# Patient Record
Sex: FEMALE | Race: WHITE | HISPANIC OR LATINO | Employment: UNEMPLOYED | URBAN - METROPOLITAN AREA
[De-identification: names, ages, dates, MRNs, and addresses within clinical notes are randomized per-mention and may not be internally consistent; named-entity substitution may affect disease eponyms.]

---

## 2017-01-05 ENCOUNTER — HOSPITAL ENCOUNTER (OUTPATIENT)
Facility: MEDICAL CENTER | Age: 27
End: 2017-01-05
Attending: OBSTETRICS & GYNECOLOGY | Admitting: OBSTETRICS & GYNECOLOGY

## 2017-01-05 ENCOUNTER — APPOINTMENT (OUTPATIENT)
Dept: OBGYN | Facility: CLINIC | Age: 27
End: 2017-01-05
Payer: MEDICAID

## 2017-01-09 ENCOUNTER — HOSPITAL ENCOUNTER (OUTPATIENT)
Facility: MEDICAL CENTER | Age: 27
End: 2017-01-09
Attending: NURSE PRACTITIONER
Payer: COMMERCIAL

## 2017-01-09 ENCOUNTER — ROUTINE PRENATAL (OUTPATIENT)
Dept: OBGYN | Facility: CLINIC | Age: 27
End: 2017-01-09
Payer: MEDICAID

## 2017-01-09 ENCOUNTER — INITIAL PRENATAL (OUTPATIENT)
Dept: OBGYN | Facility: CLINIC | Age: 27
End: 2017-01-09
Payer: MEDICAID

## 2017-01-09 VITALS
WEIGHT: 153 LBS | SYSTOLIC BLOOD PRESSURE: 100 MMHG | BODY MASS INDEX: 24.59 KG/M2 | DIASTOLIC BLOOD PRESSURE: 58 MMHG | HEIGHT: 66 IN

## 2017-01-09 DIAGNOSIS — Z34.80 SUPERVISION OF OTHER NORMAL PREGNANCY, ANTEPARTUM: ICD-10-CM

## 2017-01-09 DIAGNOSIS — Z34.03 ENCOUNTER FOR SUPERVISION OF NORMAL FIRST PREGNANCY IN THIRD TRIMESTER: ICD-10-CM

## 2017-01-09 DIAGNOSIS — O36.5930 SMALL FOR DATES AFFECTING MANAGEMENT OF MOTHER, THIRD TRIMESTER, NOT APPLICABLE OR UNSPECIFIED FETUS: ICD-10-CM

## 2017-01-09 PROBLEM — Z34.00 SUPERVISION OF NORMAL FIRST PREGNANCY: Status: ACTIVE | Noted: 2017-01-09

## 2017-01-09 LAB
APPEARANCE UR: NORMAL
BILIRUB UR STRIP-MCNC: NORMAL MG/DL
COLOR UR AUTO: NORMAL
GLUCOSE UR STRIP.AUTO-MCNC: NEGATIVE MG/DL
KETONES UR STRIP.AUTO-MCNC: NEGATIVE MG/DL
LEUKOCYTE ESTERASE UR QL STRIP.AUTO: NEGATIVE
NITRITE UR QL STRIP.AUTO: NEGATIVE
NST ACOUSTIC STIMULATION: NORMAL
NST ACTION NECESSARY: NORMAL
NST ASSESSMENT: NORMAL
NST BASELINE: 130
NST INDICATIONS: NORMAL
NST OTHER DATA: NORMAL
NST READ BY: NORMAL
NST RETURN: NORMAL
NST UTERINE ACTIVITY: NORMAL
PH UR STRIP.AUTO: 6 [PH] (ref 5–8)
PROT UR QL STRIP: NEGATIVE MG/DL
RBC UR QL AUTO: NEGATIVE
SP GR UR STRIP.AUTO: 1
UROBILINOGEN UR STRIP-MCNC: NORMAL MG/DL

## 2017-01-09 PROCEDURE — 59025 FETAL NON-STRESS TEST: CPT | Performed by: NURSE PRACTITIONER

## 2017-01-09 PROCEDURE — 90040 PR PRENATAL FOLLOW UP: CPT | Performed by: NURSE PRACTITIONER

## 2017-01-09 PROCEDURE — 59401 PR NEW OB VISIT: CPT | Performed by: NURSE PRACTITIONER

## 2017-01-09 PROCEDURE — 81002 URINALYSIS NONAUTO W/O SCOPE: CPT | Performed by: NURSE PRACTITIONER

## 2017-01-09 NOTE — PROGRESS NOTES
"S:  Concetta Lozada is a 26 y.o.  who presents for her new OB exam.  She is 38w1d with and LIDIA of Estimated Date of Delivery: 17. Dating based on patient report - previous care in [facility]. She has c/o non-progressive cramping.  No bleeding or leaking of fluid. She is currently not working. No heavy lifting or chemical exposure. No ER visits or previous care.     declines AFP.  declines CF.  Denies VB, LOF, or cramping.  Denies dysuria, vaginal DC. Reports good fetal movement.     Pt is  and lives with FOB.  Pregnancy is desired.      Past Medical History   Diagnosis Date   • Other ill-defined conditions      POCS     Family History   Problem Relation Age of Onset   • Diabetes Mother    • Hypertension Mother      Social History     Social History   • Marital Status: Single     Spouse Name: N/A   • Number of Children: N/A   • Years of Education: N/A     Occupational History   • Not on file.     Social History Main Topics   • Smoking status: Never Smoker    • Smokeless tobacco: Not on file   • Alcohol Use: No   • Drug Use: No   • Sexual Activity:     Partners: Male     Other Topics Concern   • Not on file     Social History Narrative   • No narrative on file     OB History    Para Term  AB SAB TAB Ectopic Multiple Living   1               # Outcome Date GA Lbr Alec/2nd Weight Sex Delivery Anes PTL Lv   1 Current                   History of HSV I or II in self or partner: no  History of Thyroid problems: no    O:    Filed Vitals:    17 1400   BP: 100/58   Height: 1.664 m (5' 5.5\")   Weight: 69.4 kg (153 lb)      See Prenatal Physical.    Wet mount: none      A:   1.  IUP @ 38w1d per patient report of previous care in [facility]        2.  S<D        3.  See problem list below        4.  No verified dating       Patient Active Problem List    Diagnosis Date Noted   • Supervision of normal first pregnancy 2017         P:  1.  GC/CT done        2.  Prenatal labs ordered - lab slip " given including glucola        3.  Discussed PNV, diet, avoidances and adequate water intake        4.  NOB packet given        5.  Return to office in 1 wks        6.  Complete OB US first available.         7.  NST for size less than dates. Continue 2x week nst until dating verified. Fit in ultrasound for tomorrow at this clinic.         8. GBS today.     No orders of the defined types were placed in this encounter.

## 2017-01-09 NOTE — PROGRESS NOTES
NOB today. Sure about LMP  Had some prenatal care in [place], no records available.  On PNV  Last pap 1 year ago= negative ( Jesus)  Phone #  [phone]  Pharmacy confirmed  + FM, no VB or LOF  C/o irregular UC's   Kick count sheet given today.

## 2017-01-09 NOTE — Clinical Note
Cuente los Movimientos de lazcano Bebé  Otro paso importante para la hannah de lazcano bebé    Concetta Lozada     [facility]            Dept: [phone]    ¿Cuántas semanas tiene de embarazo? 38w1d    Fecha cuando tiene que comenzar a contar el movimiento: 1/9/17                  Narberth debe usar viky diagrama    Luis Felipe manera en que lazcano doctor puede controlar a hannah de lazcano bebé es sabiendo cuantas veces se mueve lazcano bebé en el útero, o por medio de las “pataditas”.  Usted podrá ayudarle a lazcano médico al usar cada día el siguiente diagrama.    Cada día, usted debe prestar atención a cuantas horas le lleva a lazcano bebé moverse 10 veces.  Comience a contar en la mañana, lo antes posible después de haberse levantado.    · Primeramente, escriba la hora en que se mueve lazcano bebé, hasta llegar a 10 veces.  · Colóquele un check o palomita a cada cuadrito cada vez que lazcano bebé se mueva hasta que complete 10 veces.  · Escriba la hora cuando termine de contar 10 veces en la última columna.  · Sume el total del tiempo que le llevó contar los 10 movimientos.  · Finalmente, complete el cuadrito de cuantas horas le llevó hacerlo.    Después de judie contado los 10 movimientos, ya no tendrá que contar los demás movimientos por el darell del día.  A la mañana siguiente, comience a contar de nuevo cuantas veces se mueve el bebé desde el momento en que se levante.    ¿Qué tendría que considerarse un “movimiento”?  Es difícil de decirlo porque es distinto de luis felipe madre a otra, y de un embarazo a otro.  Lo importante es que cuente el movimiento de la misma manera ricki el transcurso de lazcano embarazo.  Si tiene preguntas adicionales, pregúntele a lazacno doctor.    ¡Cuente cuidadosamente cada día!     MUESTRA:  Semana 28    ¿Cuántas horas le ha llevado sentir 10 movimientos?        Hora de Inicio     1     2     3     4     5     6     7     8     9     10   Hora de Finlizar   Roma. 8:20 ·  ·  ·  ·  ·  ·  ·  ·  ·  ·  11:40   Mar.               Mié.                Jue.               Vie.               Sáb.               Dom.                 IMPORTANTE:  Usted debe contactar a lazcano doctor si le lleva más de 2 horas sentir 10 movimientos de lazcano bebé.    Cada mañana, escriba la hora de inicio y comience a contar los movimientos de lazcano bebé.  Hágalo colocándole un check o palomita a cada cuadrito cada vez que sienta un movimiento de lazcano bebé.  Cuando haya sentido 10 “pataditas”, escriba la hora en que terminó de contar en la última columna.  Luego, complete en la cajita (arriba de la vanna de check o palomita) el número total de horas que le llevó hacerlo.  Asegúrese de leer completamente las instrucciones en la página anterior.

## 2017-01-09 NOTE — MR AVS SNAPSHOT
"        Concetta Dodd Amaya   2017 2:00 PM   Initial Prenatal   MRN: [id number]    Department[facility]  [facility]   Dept Phone:  [phone]    Description:  Female : 1990   Provider:  Jennifer Maynard D.N.P.; [facility] INTAKE           Allergies as of 2017     No Known Allergies      You were diagnosed with     Supervision of other normal pregnancy, antepartum   [5454545]       Encounter for supervision of normal first pregnancy in third trimester   [710495]         Vital Signs     Blood Pressure Height Weight Body Mass Index Last Menstrual Period Smoking Status    100/58 mmHg 1.664 m (5' 5.5\") 69.4 kg (153 lb) 25.06 kg/m2 2016 Never Smoker       Basic Information     Date Of Birth Sex Race Ethnicity Preferred Language    1990 Female Unable to Obtain  Origin (Surinamese,Sammarinese,Cameroonian,Bulgarian, etc) Surinamese      Your appointments     Beau 10, 2017  7:30 AM   US PREG 60 with S [place] US 2   [facility] [place]    [facility] [place]  [address]  [address]  [facility] [address]   [phone]           For [facility] patients only: 1. Please arrive 15 min prior to your appointment time. 2. If you're late, you will be rescheduled for the next available appointment. 3. If you need to reschedule your appointment, please call us at [phone] 48 hours prior to your appointment. 4. Do not bring children as they will not be allowed in exam room. 5. Only one family member may be present in room during exam. 6. The exam will be 30-60 minutes depending on the exam ordered by the physician. 7. The sonographer is not allowed to discuss findings during the exam. Your provider will go over the results with you at your next appointment. 8. The purpose of this ultrasound is to determine if baby is healthy. Diagnostic ultrasounds are NOT to determine the gender of the baby. 9. NO photography or video recording is allowed in exam room. 10. NO cell phones allowed " in the exam room. INFORMACION SOBRE LAZCANO ULTRASONIDO 1. Por favor de llegar 15 minutos antes de lazcano miguel angel. 2. Si llega tarde, le tenemos que cambiar la miguel angel para otra fecha. 3. Si necesita cambiar lazcano miguel angel, por favor llame 48 horas antes de la miguel angel. [phone] 4. Por favor no traer niños. No se permiten en cuarto de Ultrasonido. 5. Solamente se permite luis felipe persona en el cuarto ricki el examen. 6. El examen dura 30-60 minutos, dependiendo del examen ordenado por el Doctor. 7. El Sonógrafo no está autorizado hablar sobre lazcano examen. Lazcano doctor o partera le va explicar los resultados en lazcano próxima miguel angel. 8. El propósito del Ultrasonido es para determinar si lazcano tyshawn viene saludable. No es para determinar el sexo de lazcano tyshawn. 9. Por favor no fotos o cámaras de grabar. 10. No celulares permitidos en el cuarto de examen.              Problem List              ICD-10-CM Priority Class Noted - Resolved    Supervision of normal first pregnancy Z34.00   1/9/2017 - Present      Health Maintenance     Patient has no pending health maintenance at this time      Results     POCT Urinalysis      Component Value Standard Range & Units    POC Color  Negative    POC Appearance  Negative    POC Leukocyte Esterase NEGATIVE Negative    POC Nitrites NEGATIVE Negative    POC Urobiligen  Negative (0.2) mg/dL    POC Protein NEGATIVE Negative mg/dL    POC Urine PH 6.0 5.0 - 8.0    POC Blood NEGATIVE Negative    POC Specific Gravity 1.005 <1.005 - >1.030    POC Ketones NEGATIVE Negative mg/dL    POC Biliruben  Negative mg/dL    POC Glucose NEGATIVE Negative mg/dL                        Current Immunizations     No immunizations on file.      Below and/or attached are the medications your provider expects you to take. Review all of your home medications and newly ordered medications with your provider and/or pharmacist. Follow medication instructions as directed by your provider and/or pharmacist. Please keep your medication list with you and share  with your provider. Update the information when medications are discontinued, doses are changed, or new medications (including over-the-counter products) are added; and carry medication information at all times in the event of emergency situations     Allergies:  No Known Allergies          Medications  Valid as of: January 09, 2017 -  3:23 PM    Generic Name Brand Name Tablet Size Instructions for use    Prenatal Vit w/ Fe Bisg-FA   Take  by mouth.        .                 Medicines prescribed today were sent to:     [facility] [address] [place] [address], [place] - [address] [place] [address]    Phone: [phone] Fax: [phone]    Open 24 Hours?: No      Medication refill instructions:       If your prescription bottle indicates you have medication refills left, it is not necessary to call your provider’s office. Please contact your pharmacy and they will refill your medication.    If your prescription bottle indicates you do not have any refills left, you may request refills at any time through one of the following ways: The online True Pivot system (except Urgent Care), by calling your provider’s office, or by asking your pharmacy to contact your provider’s office with a refill request. Medication refills are processed only during regular business hours and may not be available until the next business day. Your provider may request additional information or to have a follow-up visit with you prior to refilling your medication.   *Please Note: Medication refills are assigned a new Rx number when refilled electronically. Your pharmacy may indicate that no refills were authorized even though a new prescription for the same medication is available at the pharmacy. Please request the medicine by name with the pharmacy before contacting your provider for a refill.        Your To Do List     Future Labs/Procedures Complete By Expires    Chlamydia/GC PCR Urine or Swab  As directed 1/9/2018     GLUCOSE 1HR GESTATIONAL  As directed 1/9/2018    GRP B STREP, BY PCR (GRAHAM BROTH)  As directed 1/9/2018    Comments:    Source: vaginal/rectal    PREG CNTR PRENATAL PN  As directed 1/10/2018    US-OB 2ND 3RD TRI COMPLETE  As directed 7/12/2017         RiverOne Access Code: [email or web address]  Expires: 1/28/2017  4:05 PM    Your email address is not on file at [facility].  Email Addresses are required for you to sign up for RiverOne, please contact [phone] to verify your personal information and to provide your email address prior to attempting to register for RiverOne.    Concetta Lozada  [address]  [place] [address]    RiverOne  A secure, online tool to manage your health information     [employer]’s RiverOne® is a secure, online tool that connects you to your personalized health information from the privacy of your home -- day or night - making it very easy for you to manage your healthcare. Once the activation process is completed, you can even access your medical information using the RiverOne jayne, which is available for free in the Apple Jayne store or Google Play store.     To learn more about RiverOne, visit [email or web address]    There are two levels of access available (as shown below):   My Chart Features  [facility] Primary Care Doctor [facility]  Specialists [facility]  Urgent  Care Non-[facility] Primary Care Doctor   Email your healthcare team securely and privately 24/7 X X X    Manage appointments: schedule your next appointment; view details of past/upcoming appointments X      Request prescription refills. X      View recent personal medical records, including lab and immunizations X X X X   View health record, including health history, allergies, medications X X X X   Read reports about your outpatient visits, procedures, consult and ER notes X X X X   See your discharge summary, which is a recap of your hospital and/or ER visit that includes your diagnosis, lab results, and care plan X X  X      How to register for fluIT Biosystems:  Once your e-mail address has been verified, follow the following steps to sign up for fluIT Biosystems.     1. Go to  [email or web address]  2. Click on the Sign Up Now box, which takes you to the New Member Sign Up page. You will need to provide the following information:  a. Enter your fluIT Biosystems Access Code exactly as it appears at the top of this page. (You will not need to use this code after you’ve completed the sign-up process. If you do not sign up before the expiration date, you must request a new code.)   b. Enter your date of birth.   c. Enter your home email address.   d. Click Submit, and follow the next screen’s instructions.  3. Create a Social Mediant ID. This will be your fluIT Biosystems login ID and cannot be changed, so think of one that is secure and easy to remember.  4. Create a Social Mediant password. You can change your password at any time.  5. Enter your Password Reset Question and Answer. This can be used at a later time if you forget your password.   6. Enter your e-mail address. This allows you to receive e-mail notifications when new information is available in fluIT Biosystems.  7. Click Sign Up. You can now view your health information.    For assistance activating your fluIT Biosystems account, call [phone]

## 2017-01-10 ENCOUNTER — HOSPITAL ENCOUNTER (OUTPATIENT)
Dept: RADIOLOGY | Facility: MEDICAL CENTER | Age: 27
End: 2017-01-10
Attending: NURSE PRACTITIONER
Payer: OTHER MISCELLANEOUS

## 2017-01-10 ENCOUNTER — HOSPITAL ENCOUNTER (OUTPATIENT)
Dept: LAB | Facility: MEDICAL CENTER | Age: 27
End: 2017-01-10
Attending: NURSE PRACTITIONER
Payer: COMMERCIAL

## 2017-01-10 DIAGNOSIS — Z34.80 SUPERVISION OF OTHER NORMAL PREGNANCY, ANTEPARTUM: ICD-10-CM

## 2017-01-10 DIAGNOSIS — Z34.03 ENCOUNTER FOR SUPERVISION OF NORMAL FIRST PREGNANCY IN THIRD TRIMESTER: ICD-10-CM

## 2017-01-10 LAB
ABO GROUP BLD: NORMAL
APPEARANCE UR: CLEAR
BACTERIA #/AREA URNS HPF: ABNORMAL /HPF
BASOPHILS # BLD AUTO: 0.03 K/UL (ref 0–0.12)
BASOPHILS NFR BLD AUTO: 0.3 % (ref 0–1.8)
BILIRUB UR QL STRIP.AUTO: NEGATIVE
BLD GP AB SCN SERPL QL: NORMAL
C TRACH DNA GENITAL QL NAA+PROBE: NEGATIVE
COLOR UR AUTO: ABNORMAL
CULTURE IF INDICATED INDCX: YES UA CULTURE
EOSINOPHIL # BLD: 0.02 K/UL (ref 0–0.51)
EOSINOPHIL NFR BLD AUTO: 0.2 % (ref 0–6.9)
EPITHELIAL CELLS 1715: ABNORMAL /HPF
ERYTHROCYTE [DISTWIDTH] IN BLOOD BY AUTOMATED COUNT: 43.8 FL (ref 35.9–50)
GLUCOSE 1H P 50 G GLC PO SERPL-MCNC: 118 MG/DL (ref 70–139)
GLUCOSE UR STRIP.AUTO-MCNC: NEGATIVE MG/DL
HBV SURFACE AG SERPL QL IA: NEGATIVE
HCT VFR BLD AUTO: 39.3 % (ref 37–47)
HGB BLD-MCNC: 13.1 G/DL (ref 12–16)
HIV 1+2 AB+HIV1 P24 AG SERPL QL IA: NON REACTIVE
IMM GRANULOCYTES # BLD AUTO: 0.06 K/UL (ref 0–0.11)
IMM GRANULOCYTES NFR BLD AUTO: 0.6 % (ref 0–0.9)
KETONES UR STRIP.AUTO-MCNC: NEGATIVE MG/DL
LEUKOCYTE ESTERASE UR QL STRIP.AUTO: ABNORMAL
LYMPHOCYTES # BLD: 2.18 K/UL (ref 1–4.8)
LYMPHOCYTES NFR BLD AUTO: 20.1 % (ref 22–41)
MCH RBC QN AUTO: 28.9 PG (ref 27–33)
MCHC RBC AUTO-ENTMCNC: 33.3 G/DL (ref 33.6–35)
MCV RBC AUTO: 86.8 FL (ref 81.4–97.8)
MICRO URNS: ABNORMAL
MONOCYTES # BLD: 0.48 K/UL (ref 0–0.85)
MONOCYTES NFR BLD AUTO: 4.4 % (ref 0–13.4)
MUCOUS THREADS URNS QL MICRO: ABNORMAL /HPF
N GONORRHOEA DNA GENITAL QL NAA+PROBE: NEGATIVE
NEUTROPHILS # BLD: 8.08 K/UL (ref 2–7.15)
NEUTROPHILS NFR BLD AUTO: 74.4 % (ref 44–72)
NITRITE UR QL STRIP.AUTO: NEGATIVE
NRBC # BLD AUTO: 0 K/UL
NRBC BLD-RTO: 0 /100 WBC
PH UR: 7 [PH]
PLATELET # BLD AUTO: 279 K/UL (ref 164–446)
PMV BLD AUTO: 11.5 FL (ref 9–12.9)
PROT UR QL STRIP: NEGATIVE MG/DL
RBC # BLD AUTO: 4.53 M/UL (ref 4.2–5.4)
RBC #/AREA URNS HPF: ABNORMAL /HPF
RBC UR QL AUTO: NEGATIVE
RH BLD: NORMAL
RUBV IGG SERPL IA-ACNC: 441.5 IU/ML
SP GR UR STRIP.AUTO: 1.01
SPECIMEN SOURCE: NORMAL
TREPONEMA PALLIDUM IGG+IGM AB [PRESENCE] IN SERUM OR PLASMA BY IMMUNOASSAY: NON REACTIVE
WBC # BLD AUTO: 10.9 K/UL (ref 4.8–10.8)
WBC #/AREA URNS HPF: ABNORMAL /HPF

## 2017-01-10 PROCEDURE — 76805 OB US >/= 14 WKS SNGL FETUS: CPT

## 2017-01-11 LAB — GP B STREP DNA SPEC QL NAA+PROBE: POSITIVE

## 2017-01-12 ENCOUNTER — ROUTINE PRENATAL (OUTPATIENT)
Dept: OBGYN | Facility: CLINIC | Age: 27
End: 2017-01-12
Payer: MEDICAID

## 2017-01-12 DIAGNOSIS — O26.843 UTERINE SIZE DATE DISCREPANCY PREGNANCY, THIRD TRIMESTER: ICD-10-CM

## 2017-01-12 LAB
BACTERIA UR CULT: NORMAL
NST ACOUSTIC STIMULATION: NORMAL
NST ACTION NECESSARY: NORMAL
NST ASSESSMENT: NORMAL
NST BASELINE: NORMAL
NST INDICATIONS: NORMAL
NST OTHER DATA: NORMAL
NST READ BY: NORMAL
NST RETURN: NORMAL
NST UTERINE ACTIVITY: NORMAL
SIGNIFICANT IND 70042: NORMAL
SOURCE SOURCE: NORMAL

## 2017-01-12 PROCEDURE — 59025 FETAL NON-STRESS TEST: CPT | Performed by: OBSTETRICS & GYNECOLOGY

## 2017-01-12 NOTE — MR AVS SNAPSHOT
Concetta Dodd Kierra   2017 10:00 AM   Routine Prenatal   MRN: [id number]    Department:  [facility]   Dept Phone:  [phone]    Description:  Female : 1990   Provider:  Dav Garcia M.D.           Allergies as of 2017     No Known Allergies      You were diagnosed with     Uterine size date discrepancy pregnancy, third trimester   [644681]         Vital Signs     Last Menstrual Period Smoking Status                2016 Never Smoker           Basic Information     Date Of Birth Sex Race Ethnicity Preferred Language    1990 Female Unable to Obtain  Origin (Maori,Monegasque,Indonesian,Alvin, etc) English      Your appointments     Beau 15, 2017  8:00 AM   TPC INDUCTION OF LABOR with NON-SURGICAL L&D   LABOR & DELIVERY [facility] (--)    [address] [place] [address]  [place] [address]   [phone]            2017 10:00 AM   Fetal Non-Stress Test with PC ADRIANO   [facility])    [address] [place] [address] [address]  [place] [address]   [phone]            2017 11:15 AM   OB Follow Up with ALYX TRENT   [facility])    [address] [place] [address] [address]  [facility] [address]   [phone]              Problem List              ICD-10-CM Priority Class Noted - Resolved    Supervision of normal first pregnancy Z34.00   2017 - Present      Health Maintenance        Date Due Completion Dates    IMM HEP B VACCINE (1 of 3 - Primary Series) 1992 ---    IMM HEP A VACCINE (1 of 2 - Standard Series) 1993 ---    IMM HPV VACCINE (1 of 3 - Female 3 Dose Series) 2003 ---    IMM VARICELLA (CHICKENPOX) VACCINE (1 of 2 - 2 Dose Adolescent Series) 2005 ---    IMM DTaP/Tdap/Td Vaccine (1 - Tdap) 2011 ---    PAP SMEAR 2013 ---    IMM INFLUENZA (1) 2016 ---            Results     POCT Fetal Nonstress Test      Component    NST Indications    IUGR    NST Baseline    NST Uterine Activity    every 5mts.    NST Acoustic Stimulation    NST Assessment    category1    NST Action Necessary    NST Other Data    NST Return    NST Read By    maurizio                        Current Immunizations     No immunizations on file.      Below and/or attached are the medications your provider expects you to take. Review all of your home medications and newly ordered medications with your provider and/or pharmacist. Follow medication instructions as directed by your provider and/or pharmacist. Please keep your medication list with you and share with your provider. Update the information when medications are discontinued, doses are changed, or new medications (including over-the-counter products) are added; and carry medication information at all times in the event of emergency situations     Allergies:  No Known Allergies          Medications  Valid as of: January 12, 2017 - 11:12 AM    Generic Name Brand Name Tablet Size Instructions for use    Prenatal Vit w/ Fe Bisg-FA   Take  by mouth.        .                 Medicines prescribed today were sent to:     [facility] [address] [place] [address] - [address] [place] [address]    Phone: [phone] Fax: [phone]    Open 24 Hours?: No      Medication refill instructions:       If your prescription bottle indicates you have medication refills left, it is not necessary to call your provider’s office. Please contact your pharmacy and they will refill your medication.    If your prescription bottle indicates you do not have any refills left, you may request refills at any time through one of the following ways: The online Echometrix system (except Urgent Care), by calling your provider’s office, or by asking your pharmacy to contact your provider’s office with a refill request. Medication refills are processed only during regular business hours and may not be available until the next business day. Your provider may request additional information or to have a follow-up visit with you prior to refilling  your medication.   *Please Note: Medication refills are assigned a new Rx number when refilled electronically. Your pharmacy may indicate that no refills were authorized even though a new prescription for the same medication is available at the pharmacy. Please request the medicine by name with the pharmacy before contacting your provider for a refill.           EverCharge Access Code: [email or web address]  Expires: 1/28/2017  4:05 PM    Your email address is not on file at [facility].  Email Addresses are required for you to sign up for EverCharge, please contact [phone] to verify your personal information and to provide your email address prior to attempting to register for EverCharge.    Concetta Lozada  [address]  [place] [address]    EverCharge  A secure, online tool to manage your health information     [employer]’s EverCharge® is a secure, online tool that connects you to your personalized health information from the privacy of your home -- day or night - making it very easy for you to manage your healthcare. Once the activation process is completed, you can even access your medical information using the EverCharge jayne, which is available for free in the Apple Jayne store or Google Play store.     To learn more about EverCharge, visit [email or web address]    There are two levels of access available (as shown below):   My Chart Features  [facility] Primary Care Doctor [facility]  Specialists [facility]  Urgent  Care Non-[facility] Primary Care Doctor   Email your healthcare team securely and privately 24/7 X X X    Manage appointments: schedule your next appointment; view details of past/upcoming appointments X      Request prescription refills. X      View recent personal medical records, including lab and immunizations X X X X   View health record, including health history, allergies, medications X X X X   Read reports about your outpatient visits, procedures, consult and ER notes X X X X   See your discharge summary, which  is a recap of your hospital and/or ER visit that includes your diagnosis, lab results, and care plan X X  X     How to register for Switchfly:  Once your e-mail address has been verified, follow the following steps to sign up for Switchfly.     1. Go to  [email or web address]  2. Click on the Sign Up Now box, which takes you to the New Member Sign Up page. You will need to provide the following information:  a. Enter your Switchfly Access Code exactly as it appears at the top of this page. (You will not need to use this code after you’ve completed the sign-up process. If you do not sign up before the expiration date, you must request a new code.)   b. Enter your date of birth.   c. Enter your home email address.   d. Click Submit, and follow the next screen’s instructions.  3. Create a Switchfly ID. This will be your Switchfly login ID and cannot be changed, so think of one that is secure and easy to remember.  4. Create a Switchfly password. You can change your password at any time.  5. Enter your Password Reset Question and Answer. This can be used at a later time if you forget your password.   6. Enter your e-mail address. This allows you to receive e-mail notifications when new information is available in Switchfly.  7. Click Sign Up. You can now view your health information.    For assistance activating your Switchfly account, call [phone]

## 2017-01-12 NOTE — NON-PROVIDER
Pt here for NST and consulted with Dr. Garcia regarding her US results. MD reviewed US results. MD advised to schedule pt for IOL for 39wks but still pt needs to try to obtain US reports done in [facility]. Pt and family member agreed and states they will try to get them today or tomorrow and will fax them to us. Fax number provided.     IOL scheduled with Saritha in L&D for 1/15/17 at 0800. Instructions sheet given to pt and family member. Both verbalized understanding.

## 2017-01-15 ENCOUNTER — HOSPITAL ENCOUNTER (INPATIENT)
Facility: MEDICAL CENTER | Age: 27
LOS: 3 days | End: 2017-01-18
Attending: OBSTETRICS & GYNECOLOGY | Admitting: OBSTETRICS & GYNECOLOGY
Payer: MEDICAID

## 2017-01-15 LAB
BASOPHILS # BLD AUTO: 0.2 % (ref 0–1.8)
BASOPHILS # BLD: 0.02 K/UL (ref 0–0.12)
EOSINOPHIL # BLD AUTO: 0.01 K/UL (ref 0–0.51)
EOSINOPHIL NFR BLD: 0.1 % (ref 0–6.9)
ERYTHROCYTE [DISTWIDTH] IN BLOOD BY AUTOMATED COUNT: 42.5 FL (ref 35.9–50)
HCT VFR BLD AUTO: 36 % (ref 37–47)
HGB BLD-MCNC: 12.3 G/DL (ref 12–16)
HOLDING TUBE BB 8507: NORMAL
IMM GRANULOCYTES # BLD AUTO: 0.04 K/UL (ref 0–0.11)
IMM GRANULOCYTES NFR BLD AUTO: 0.4 % (ref 0–0.9)
LYMPHOCYTES # BLD AUTO: 1.85 K/UL (ref 1–4.8)
LYMPHOCYTES NFR BLD: 20.1 % (ref 22–41)
MCH RBC QN AUTO: 28.9 PG (ref 27–33)
MCHC RBC AUTO-ENTMCNC: 34.2 G/DL (ref 33.6–35)
MCV RBC AUTO: 84.5 FL (ref 81.4–97.8)
MONOCYTES # BLD AUTO: 0.55 K/UL (ref 0–0.85)
MONOCYTES NFR BLD AUTO: 6 % (ref 0–13.4)
NEUTROPHILS # BLD AUTO: 6.74 K/UL (ref 2–7.15)
NEUTROPHILS NFR BLD: 73.2 % (ref 44–72)
NRBC # BLD AUTO: 0 K/UL
NRBC BLD AUTO-RTO: 0 /100 WBC
PLATELET # BLD AUTO: 249 K/UL (ref 164–446)
PMV BLD AUTO: 10.9 FL (ref 9–12.9)
RBC # BLD AUTO: 4.26 M/UL (ref 4.2–5.4)
WBC # BLD AUTO: 9.2 K/UL (ref 4.8–10.8)

## 2017-01-15 PROCEDURE — 700111 HCHG RX REV CODE 636 W/ 250 OVERRIDE (IP): Performed by: FAMILY MEDICINE

## 2017-01-15 PROCEDURE — A9270 NON-COVERED ITEM OR SERVICE: HCPCS | Performed by: OBSTETRICS & GYNECOLOGY

## 2017-01-15 PROCEDURE — 85025 COMPLETE CBC W/AUTO DIFF WBC: CPT

## 2017-01-15 PROCEDURE — 770007 HCHG ROOM/CARE - OB SEMI PRIVATE (*

## 2017-01-15 PROCEDURE — 3E0E3GC INTRODUCTION OF OTHER THERAPEUTIC SUBSTANCE INTO PRODUCTS OF CONCEPTION, PERCUTANEOUS APPROACH: ICD-10-PCS | Performed by: OBSTETRICS & GYNECOLOGY

## 2017-01-15 PROCEDURE — 3E033VJ INTRODUCTION OF OTHER HORMONE INTO PERIPHERAL VEIN, PERCUTANEOUS APPROACH: ICD-10-PCS | Performed by: OBSTETRICS & GYNECOLOGY

## 2017-01-15 PROCEDURE — 700102 HCHG RX REV CODE 250 W/ 637 OVERRIDE(OP): Performed by: OBSTETRICS & GYNECOLOGY

## 2017-01-15 PROCEDURE — 700111 HCHG RX REV CODE 636 W/ 250 OVERRIDE (IP)

## 2017-01-15 PROCEDURE — 700111 HCHG RX REV CODE 636 W/ 250 OVERRIDE (IP): Performed by: NURSE PRACTITIONER

## 2017-01-15 RX ORDER — MISOPROSTOL 200 UG/1
600 TABLET ORAL
Status: CANCELLED | OUTPATIENT
Start: 2017-01-15

## 2017-01-15 RX ORDER — MISOPROSTOL 200 UG/1
800 TABLET ORAL
Status: DISCONTINUED | OUTPATIENT
Start: 2017-01-15 | End: 2017-01-16

## 2017-01-15 RX ORDER — SODIUM CHLORIDE, SODIUM LACTATE, POTASSIUM CHLORIDE, CALCIUM CHLORIDE 600; 310; 30; 20 MG/100ML; MG/100ML; MG/100ML; MG/100ML
INJECTION, SOLUTION INTRAVENOUS CONTINUOUS
Status: DISPENSED | OUTPATIENT
Start: 2017-01-15 | End: 2017-01-15

## 2017-01-15 RX ORDER — TERBUTALINE SULFATE 1 MG/ML
INJECTION, SOLUTION SUBCUTANEOUS
Status: DISCONTINUED
Start: 2017-01-15 | End: 2017-01-15

## 2017-01-15 RX ORDER — ACETAMINOPHEN 325 MG/1
325 TABLET ORAL EVERY 4 HOURS PRN
Status: DISCONTINUED | OUTPATIENT
Start: 2017-01-15 | End: 2017-01-16

## 2017-01-15 RX ORDER — IBUPROFEN 600 MG/1
600 TABLET ORAL EVERY 6 HOURS PRN
Status: CANCELLED | OUTPATIENT
Start: 2017-01-15

## 2017-01-15 RX ORDER — ONDANSETRON 2 MG/ML
4 INJECTION INTRAMUSCULAR; INTRAVENOUS EVERY 6 HOURS PRN
Status: CANCELLED | OUTPATIENT
Start: 2017-01-15

## 2017-01-15 RX ORDER — AMPICILLIN 1 G/1
1 INJECTION, POWDER, FOR SOLUTION INTRAMUSCULAR; INTRAVENOUS EVERY 4 HOURS
Status: DISCONTINUED | OUTPATIENT
Start: 2017-01-15 | End: 2017-01-16

## 2017-01-15 RX ORDER — HYDROCODONE BITARTRATE AND ACETAMINOPHEN 5; 325 MG/1; MG/1
1 TABLET ORAL EVERY 4 HOURS PRN
Status: CANCELLED | OUTPATIENT
Start: 2017-01-15

## 2017-01-15 RX ORDER — ACETAMINOPHEN 325 MG/1
325 TABLET ORAL EVERY 4 HOURS PRN
Status: CANCELLED | OUTPATIENT
Start: 2017-01-15

## 2017-01-15 RX ORDER — ONDANSETRON 4 MG/1
4 TABLET, ORALLY DISINTEGRATING ORAL EVERY 6 HOURS PRN
Status: DISCONTINUED | OUTPATIENT
Start: 2017-01-15 | End: 2017-01-16

## 2017-01-15 RX ORDER — ONDANSETRON 4 MG/1
4 TABLET, ORALLY DISINTEGRATING ORAL EVERY 6 HOURS PRN
Status: CANCELLED | OUTPATIENT
Start: 2017-01-15

## 2017-01-15 RX ORDER — OXYCODONE AND ACETAMINOPHEN 10; 325 MG/1; MG/1
1 TABLET ORAL EVERY 4 HOURS PRN
Status: DISCONTINUED | OUTPATIENT
Start: 2017-01-15 | End: 2017-01-16

## 2017-01-15 RX ORDER — SODIUM CHLORIDE, SODIUM LACTATE, POTASSIUM CHLORIDE, CALCIUM CHLORIDE 600; 310; 30; 20 MG/100ML; MG/100ML; MG/100ML; MG/100ML
1000 INJECTION, SOLUTION INTRAVENOUS CONTINUOUS
Status: DISCONTINUED | OUTPATIENT
Start: 2017-01-15 | End: 2017-01-16

## 2017-01-15 RX ORDER — ONDANSETRON 2 MG/ML
4 INJECTION INTRAMUSCULAR; INTRAVENOUS EVERY 6 HOURS PRN
Status: DISCONTINUED | OUTPATIENT
Start: 2017-01-15 | End: 2017-01-16

## 2017-01-15 RX ORDER — OXYCODONE HYDROCHLORIDE AND ACETAMINOPHEN 5; 325 MG/1; MG/1
2 TABLET ORAL EVERY 4 HOURS PRN
Status: CANCELLED | OUTPATIENT
Start: 2017-01-15

## 2017-01-15 RX ORDER — ROPIVACAINE HYDROCHLORIDE 2 MG/ML
INJECTION, SOLUTION EPIDURAL; INFILTRATION; PERINEURAL
Status: COMPLETED
Start: 2017-01-15 | End: 2017-01-15

## 2017-01-15 RX ORDER — OXYTOCIN 10 [USP'U]/ML
10 INJECTION, SOLUTION INTRAMUSCULAR; INTRAVENOUS ONCE
Status: CANCELLED | OUTPATIENT
Start: 2017-01-15 | End: 2017-01-15

## 2017-01-15 RX ORDER — SODIUM CHLORIDE, SODIUM LACTATE, POTASSIUM CHLORIDE, CALCIUM CHLORIDE 600; 310; 30; 20 MG/100ML; MG/100ML; MG/100ML; MG/100ML
INJECTION, SOLUTION INTRAVENOUS CONTINUOUS
Status: CANCELLED | OUTPATIENT
Start: 2017-01-15 | End: 2017-01-15

## 2017-01-15 RX ORDER — AMPICILLIN 2 G/1
2 INJECTION, POWDER, FOR SOLUTION INTRAVENOUS ONCE
Status: COMPLETED | OUTPATIENT
Start: 2017-01-15 | End: 2017-01-15

## 2017-01-15 RX ORDER — IBUPROFEN 600 MG/1
600 TABLET ORAL EVERY 6 HOURS PRN
Status: DISCONTINUED | OUTPATIENT
Start: 2017-01-15 | End: 2017-01-16

## 2017-01-15 RX ORDER — HYDROCODONE BITARTRATE AND ACETAMINOPHEN 5; 325 MG/1; MG/1
1 TABLET ORAL EVERY 4 HOURS PRN
Status: DISCONTINUED | OUTPATIENT
Start: 2017-01-15 | End: 2017-01-16

## 2017-01-15 RX ADMIN — SODIUM CHLORIDE, POTASSIUM CHLORIDE, SODIUM LACTATE AND CALCIUM CHLORIDE 1000 ML: 600; 310; 30; 20 INJECTION, SOLUTION INTRAVENOUS at 22:12

## 2017-01-15 RX ADMIN — MISOPROSTOL 25 MCG: 100 TABLET ORAL at 17:17

## 2017-01-15 RX ADMIN — MISOPROSTOL 25 MCG: 100 TABLET ORAL at 11:05

## 2017-01-15 RX ADMIN — AMPICILLIN SODIUM 2 G: 2 INJECTION, POWDER, FOR SOLUTION INTRAMUSCULAR; INTRAVENOUS at 21:28

## 2017-01-15 RX ADMIN — FENTANYL CITRATE 100 MCG: 50 INJECTION, SOLUTION INTRAMUSCULAR; INTRAVENOUS at 21:03

## 2017-01-15 RX ADMIN — SODIUM CHLORIDE, POTASSIUM CHLORIDE, SODIUM LACTATE AND CALCIUM CHLORIDE: 600; 310; 30; 20 INJECTION, SOLUTION INTRAVENOUS at 14:00

## 2017-01-15 RX ADMIN — TERBUTALINE SULFATE 0.25 MG: 1 INJECTION, SOLUTION SUBCUTANEOUS at 22:11

## 2017-01-15 RX ADMIN — SODIUM CHLORIDE, POTASSIUM CHLORIDE, SODIUM LACTATE AND CALCIUM CHLORIDE 1000 ML: 600; 310; 30; 20 INJECTION, SOLUTION INTRAVENOUS at 23:30

## 2017-01-15 ASSESSMENT — PATIENT HEALTH QUESTIONNAIRE - PHQ9
SUM OF ALL RESPONSES TO PHQ QUESTIONS 1-9: 0
SUM OF ALL RESPONSES TO PHQ9 QUESTIONS 1 AND 2: 0
2. FEELING DOWN, DEPRESSED, IRRITABLE, OR HOPELESS: NOT AT ALL
1. LITTLE INTEREST OR PLEASURE IN DOING THINGS: NOT AT ALL

## 2017-01-15 ASSESSMENT — COPD QUESTIONNAIRES
HAVE YOU SMOKED AT LEAST 100 CIGARETTES IN YOUR ENTIRE LIFE: NO/DON'T KNOW
DURING THE PAST 4 WEEKS HOW MUCH DID YOU FEEL SHORT OF BREATH: NONE/LITTLE OF THE TIME
DO YOU EVER COUGH UP ANY MUCUS OR PHLEGM?: NO/ONLY WITH OCCASIONAL COLDS OR INFECTIONS

## 2017-01-15 ASSESSMENT — LIFESTYLE VARIABLES
ALCOHOL_USE: NO
EVER_SMOKED: NEVER
DO YOU DRINK ALCOHOL: NO

## 2017-01-15 NOTE — IP AVS SNAPSHOT
1/18/2017          Concetta Lozada  [address] [address]  [place] [address]    Dear Concetta:    [facility] wants to ensure your discharge home is safe and you or your loved ones have had all your questions answered regarding your care after you leave the hospital.    You may receive a telephone call within two days of your discharge.  This call is to make certain you understand your discharge instructions as well as ensure we provided you with the best care possible during your stay with us.     The call will only last approximately 3-5 minutes and will be done by a nurse.    Once again, we want to ensure your discharge home is safe and that you have a clear understanding of any next steps in your care.  If you have any questions or concerns, please do not hesitate to contact us, we are here for you.  Thank you for choosing [facility] for your healthcare needs.    Sincerely,    Harpreet Estrada    [facility]

## 2017-01-15 NOTE — PROGRESS NOTES
Baby had a 3 min decel due to contraction that lasted the whole time. Pt turned side to side O2 on mask 8 liters iv open  to hydrate. baby spont. Recovered and is  Now reactive again. Contractions cont pt feeling but nicol. As well as baby. No needs or issues at this time.will cont to obs.update to Dr Garcia.

## 2017-01-15 NOTE — IP AVS SNAPSHOT
After Visit Summary                                                                                                                Concetta Lozada   MRN: [id number]    Department:  POST PARTUM 31   1/15/2017           Your appointments     2017  1:00 PM   Post Partum with Jennifer Maynard D.N.P.   [facility] [place] [id number] [facility] [address]  [place] [address]   [phone]               I assume responsibility for securing a follow-up  Screening blood test on my baby within the specified date range.    -                  Discharge Instructions       POSTPARTUM DISCHARGE INSTRUCTIONS FOR MOM    YOB: 1990   Age: 26 y.o.               Admit Date: 1/15/2017     Discharge Date: 2017  Attending Doctor:  Dav Garcia M.D.                  Allergies:  Review of patient's allergies indicates no known allergies.    Discharged to home by car. Discharged via wheelchair, hospital escort: Yes.  Special equipment needed: Not Applicable  Belongings with: Personal  Be sure to schedule a follow-up appointment with your primary care doctor or any specialists as instructed.     Discharge Plan:   Diet Plan: Discussed  Activity Level: Discussed  Confirmed Follow up Appointment: Patient to Call and Schedule Appointment  Confirmed Symptoms Management: Discussed  Medication Reconciliation Updated: Yes  Influenza Vaccine Indication: Not indicated: Previously immunized this influenza season and > 8 years of age    REASONS TO CALL YOUR OBSTETRICIAN:  1.   Persistent fever or shaking chills (Temperature higher than 100.4)  2.   Heavy bleeding (soaking more than 1 pad per hour); Passing clots  3.   Foul odor from vagina  4.   Mastitis (Breast infection; breast pain, chills, fever, redness)  5.   Urinary pain, burning or frequency  6.   Episiotomy infection  8.   Severe depression longer than 24 hours    HAND WASHING  · Prior to handling the baby.  · Before breastfeeding or bottle feeding  "baby.  · After using the bathroom or changing the baby's diaper.      VAGINAL CARE  · Nothing inside vagina for 6 weeks: no sexual intercourse, tampons or douching.  · Bleeding may continue for 2-4 weeks.  Amount may vary.    · Call your physician for heavy bleeding which means soaking more than 1 pad per hour    BIRTH CONTROL  · It is possible to become pregnant at any time after delivery and while breastfeeding.  · Plan to discuss a method of birth control with your physician at your follow up visit. visit.    DIET AND ELIMINATION  · Eating more fiber (bran cereal, fruits, and vegetables) and drinking plenty of fluids will help to avoid constipation.  · Urinary frequency after childbirth is normal.    POSTPARTUM BLUES  During the first few days after birth, you may experience a sense of the \"blues\" which may include impatience, irritability or even crying.  These feeling come and go quickly.  However, as many as 1 in 10 women experience emotional symptoms known as postpartum depression.    Postpartum depression:  May start as early as the second or third day after delivery or take several weeks or months to develop.  Symptoms of \"blues\" are present, but are more intense:  Crying spells; loss of appetite; feelings of hopelessness or loss of control; fear of touching the baby; over concern or no concern at all about the baby; little or no concern about your own appearance/caring for yourself; and/or inability to sleep or excessive sleeping.  Contact your physician if you are experiencing any of these symptoms.    Crisis Hotline:  · [place] Crisis Hotline:  6-561-OYIIKTF  Or 1-182.978.1847  · [place] Crisis Hotline:  1-961.709.4268  Or 043-441-2117    PREVENTING SHAKEN BABY:  If you are angry or stressed, PUT THE BABY IN THE CRIB, step away, take some deep breaths, and wait until you are calm to care for the baby.  DO NOT SHAKE THE BABY.  You are not alone, call a supporter for help.  · Crisis Call Center 24/7 crisis " "line 770-244-9418 or 1-102.563.7375  · You can also text them, text \"ANSWER\" to 177560    QUIT SMOKING/TOBACCO USE:  I understand the use of any tobacco products increases my chance of suffering from future heart disease and could cause other illnesses which may shorten my life. Quitting the use of tobacco products is the single most important thing I can do to improve my health. For further information on smoking / tobacco cessation call a Toll Free Quit Line at 1-700.492.1370 (*National Cancer [place]) or 1-199.776.5569 (American Lung Association) or you can access the web based program at www.lungusa.org.  · [place] Tobacco Users Help Line:  (407) 589-5797       Toll Free: 1-967.380.4806  · Quit Tobacco Program [facility] (447)690-0815    DEPRESSION / SUICIDE RISK:  As you are discharged from this [facility], it is important to learn how to keep safe from harming yourself.    Recognize the warning signs:  · Abrupt changes in personality, positive or negative- including increase in energy   · Giving away possessions  · Change in eating patterns- significant weight changes-  positive or negative  · Change in sleeping patterns- unable to sleep or sleeping all the time   · Unwillingness or inability to communicate  · Depression  · Unusual sadness, discouragement and loneliness  · Talk of wanting to die  · Neglect of personal appearance   · Rebelliousness- reckless behavior  · Withdrawal from people/activities they love  · Confusion- inability to concentrate     If you or a loved one observes any of these behaviors or has concerns about self-harm, here's what you can do:  · Talk about it- your feelings and reasons for harming yourself  · Remove any means that you might use to hurt yourself (examples: pills, rope, extension cords, firearm)  · Get professional help from the community (Mental Health, Substance Abuse, psychological counseling)  · Do not be alone:Call your Safe Contact- " someone whom you trust who will be there for you.  · Call your local CRISIS HOTLINE 976-7670 or 818-763-6910  · Call your local Children's Mobile Crisis Response Team [place] (950) 972-5884 or www.OpenDNS  · Call the toll free National Suicide Prevention Hotlines   · National Suicide Prevention Lifeline 185-887-RIDZ (8394)  · National Hope Line Network 800-SUICIDE (438-6383)    DISCHARGE SURVEY:  Thank you for choosing [facility].  We hope we provided you with very good care.  You may be receiving a survey.  Your opinion is valuable to us.    ADDITIONAL EDUCATIONAL MATERIALS GIVEN TO PATIENT: none    My signature on this form indicates that:  1.  I have reviewed and understand the above information  2.  My questions regarding this information have been answered to my satisfaction.  3.  I have formulated a plan with my discharge nurse to obtain my prescribed medication for home.         Discharge Medication Instructions:    Below are the medications your physician expects you to take upon discharge:    Review all your home medications and newly ordered medications with your doctor and/or pharmacist. Follow medication instructions as directed by your doctor and/or pharmacist.    Please keep your medication list with you and share with your physician.               Medication List      START taking these medications        Instructions     MG Caps   Last time this was given:  100 mg on 1/18/2017  8:52 AM    Take 100 mg by mouth 2 times a day as needed for Constipation for up to 60 days.   Dose:  100 mg       ibuprofen 600 MG Tabs   Last time this was given:  600 mg on 1/18/2017  8:52 AM   Commonly known as:  MOTRIN    Take 1 Tab by mouth every 6 hours as needed (Cramping) for up to 15 days.   Dose:  600 mg         CONTINUE taking these medications        Instructions    PRENATAL VIT W/ FE BISG-FA PO    Take  by mouth.               Crisis Hotline:     National Crisis Hotline:  3-749-QPTUSQC or  1-748.890.9790    [place] Crisis Hotline:    1-774.953.7737 or 569-209-7654        Disclaimer           _____________________________________                     __________       ________       Patient/Mother Signature or Legal                          Date                   Time

## 2017-01-15 NOTE — H&P
History and Physical    Concetta Lozada is a 26 y.o. female  -Para:     Gestational Age:  39w0d  Admitted for:   Induction of Labor  Admitted to  [facility].  [facility]    HPI: Patient is admitted with the above mentioned Chief Complaint and States   Loss of fluid:   negative  Abdominal Pain:  negative  Uterine Contractions:  negative  Vaginal Bleeding:  negative  Fetal Movement:  normal  Patient denies fever, chills, nausea, vomiting , headache, visual disturbance, or dysuria  Patient's last menstrual period was 2016.  Estimated Date of Delivery: 17  Final LIDIA: 2017, by Last Menstrual Period    Patient Active Problem List    Diagnosis Date Noted   • Supervision of normal first pregnancy 2017       Admitting DX: Pregnancy  IOL  Pregnancy Complications:  IUGR  OB Risk Factors:   [place] transfer of acre.  Labor State:    Not in labor.    History:   has a past medical history of Other ill-defined conditions. She also has no past medical history of Blood transfusion without reported diagnosis.     has no past surgical history on file.    OB History    Para Term  AB SAB TAB Ectopic Multiple Living   1               # Outcome Date GA Lbr Alec/2nd Weight Sex Delivery Anes PTL Lv   1 Current                   Medications:  No current facility-administered medications on file prior to encounter.     Current Outpatient Prescriptions on File Prior to Encounter   Medication Sig Dispense Refill   • PRENATAL VIT W/ FE BISG-FA PO Take  by mouth.         Allergies:  Review of patient's allergies indicates no known allergies.    ROS:   Neuro: negative    Cardiovascular: negative  Gastro intestinal: negative  Genitourinary: negative            Physical Exam:  LMP 2016  Constitutional: healthy-appearing, Well-developed, well-nourished  No JVD: at 60 degrees  HEENT: PERRLA  Breast Exam: negative  Cardio: regular rate and  rhythm, S1, S2 normal, no murmur, click, rub or gallop  Lung: unlabored respirations, no intercostal retractions or accessory muscle use  Abdomen: abdomen is soft without significant tenderness, masses, organomegaly or guarding  Extremity: extremities, peripheral pulses and reflexes normal    Cervical Exam: 20%  Cervix Dilatation:closed.  Station: negative 3  Pelvis: Normal  Fetal Assessment: Fetal Heart Rate accelerations: yes  Estimated Fetal Weight: 3000 - 3500g      Labs:      Prenatal Results         1st Trimester Date Time   ABO  A  01/10/17 1015   RH  POS  01/10/17 1015   Antibody  NEG  01/10/17 1015   CBC/PLT/DIFF      HGB  13.1 g/dL 01/10/17 1015   Platelets  279 K/uL 01/10/17 1015   HGB A1C       1 Hr GCT  118 mg/dL 01/10/17 1015   3 Hr GTT      Rubella  441.50 IU/mL 01/10/17 1015   RPR      Urine Culture  No growth at 48 hours  01/10/17 1015   24 Urine Protein       24 Urine Creatinine       HBsAg  Negative  01/10/17 1015   Hep CAB       HIV      Gonorrhea      Chlamydia      TSH       Free T4        TB      Pap      SYPHILUS TREP QUAL [id number] [[id number]][      2nd Trimester Date Time   HCT  39.3 % 01/10/17 1015   HGB  13.1 g/dL 01/10/17 1015   1 Hr GCT  118 mg/dL 01/10/17 1015   3 Hr GTT      24-28 Weeks Date Time   1 Hr GCT   118 mg/dL 01/10/17 1015   TSH       Free T4       24 Urine Protein      24 Urine Creatinine      BUN      Creatinine      GFR      AST      ALT      Uric Acid      LDH      3rd Trimester Date Time   HCT  39.3 % 01/10/17 1015   HGB  13.1 g/dL 01/10/17 1015   TSH      Free T4      24 Hr Urine Protein      24 Hr Urine Creatinine      SYPHILUS TREP QUAL  Non Reactive  01/10/17 1015   35-37 Weeks Date Time   GBS PCR LB  POSITIVE  (A) 01/09/17 1450   GBS PCR  POSITIVE  (A) 01/09/17 1450   Genetic Screening Date Time   Cystic Fibrosis      AFP Quad      Sickle Cell                  View all results for this pregnancy            Assessment:  Gestational Age:  39w0d dated by 10 weeks US on  - 10weeks 1 day LIDIA- 2017.    Labor State:   Antepartum  Risk Factors:   IUGR  Pregnancy Complications: late transfer of care.  GBS positive.      Patient Active Problem List    Diagnosis Date Noted   • Supervision of normal first pregnancy 2017       Plan:   Admitted for: Induction of Labor    CBC and UA  routine urinalysis  Antibiotics: Prophylaxis for GBS    Dav Garcia M.D.

## 2017-01-15 NOTE — IP AVS SNAPSHOT
Wenwo Access Code: [email or web address]  Expires: 1/28/2017  4:05 PM    Your email address is not on file at [facility].  Email Addresses are required for you to sign up for Wenwo, please contact [phone] to verify your personal information and to provide your email address prior to attempting to register for Wenwo.    Concetta Dodd Kierra  [address] [place] [address]    Wenwo  A secure, online tool to manage your health information     [facility]’s Wenwo® is a secure, online tool that connects you to your personalized health information from the privacy of your home -- day or night - making it very easy for you to manage your healthcare. Once the activation process is completed, you can even access your medical information using the Wenwo jayne, which is available for free in the Apple Jayne store or Google Play store.     To learn more about Wenwo, visit [email or web address]    There are two levels of access available (as shown below):   My Chart Features  [facility] Primary Care Doctor [facility]  Specialists [facility]  Urgent  Care Non-[facility] Primary Care Doctor   Email your healthcare team securely and privately 24/7 X X X    Manage appointments: schedule your next appointment; view details of past/upcoming appointments X      Request prescription refills. X      View recent personal medical records, including lab and immunizations X X X X   View health record, including health history, allergies, medications X X X X   Read reports about your outpatient visits, procedures, consult and ER notes X X X X   See your discharge summary, which is a recap of your hospital and/or ER visit that includes your diagnosis, lab results, and care plan X X  X     How to register for [employer]:  Once your e-mail address has been verified, follow the following steps to sign up for Wenwo.     1. Go to  [email or web address]  2. Click on the Sign Up Now box, which takes you to the New Member Sign Up page. You  will need to provide the following information:  a. Enter your [facility] Access Code exactly as it appears at the top of this page. (You will not need to use this code after you’ve completed the sign-up process. If you do not sign up before the expiration date, you must request a new code.)   b. Enter your date of birth.   c. Enter your home email address.   d. Click Submit, and follow the next screen’s instructions.  3. Create a [employer] ID. This will be your [facility] login ID and cannot be changed, so think of one that is secure and easy to remember.  4. Create a [facility] password. You can change your password at any time.  5. Enter your Password Reset Question and Answer. This can be used at a later time if you forget your password.   6. Enter your e-mail address. This allows you to receive e-mail notifications when new information is available in [facility].  7. Click Sign Up. You can now view your health information.    For assistance activating your [facility] account, call [phone]

## 2017-01-16 PROCEDURE — A9270 NON-COVERED ITEM OR SERVICE: HCPCS | Performed by: FAMILY MEDICINE

## 2017-01-16 PROCEDURE — 304965 HCHG RECOVERY SERVICES

## 2017-01-16 PROCEDURE — 770002 HCHG ROOM/CARE - OB PRIVATE (112)

## 2017-01-16 PROCEDURE — A9270 NON-COVERED ITEM OR SERVICE: HCPCS | Performed by: NURSE PRACTITIONER

## 2017-01-16 PROCEDURE — 59409 OBSTETRICAL CARE: CPT

## 2017-01-16 PROCEDURE — 700111 HCHG RX REV CODE 636 W/ 250 OVERRIDE (IP): Performed by: FAMILY MEDICINE

## 2017-01-16 PROCEDURE — 36415 COLL VENOUS BLD VENIPUNCTURE: CPT

## 2017-01-16 PROCEDURE — 700102 HCHG RX REV CODE 250 W/ 637 OVERRIDE(OP): Performed by: FAMILY MEDICINE

## 2017-01-16 PROCEDURE — 700102 HCHG RX REV CODE 250 W/ 637 OVERRIDE(OP): Performed by: NURSE PRACTITIONER

## 2017-01-16 RX ORDER — BISACODYL 10 MG
10 SUPPOSITORY, RECTAL RECTAL PRN
Status: DISCONTINUED | OUTPATIENT
Start: 2017-01-16 | End: 2017-01-18 | Stop reason: HOSPADM

## 2017-01-16 RX ORDER — VITAMIN A ACETATE, BETA CAROTENE, ASCORBIC ACID, CHOLECALCIFEROL, .ALPHA.-TOCOPHEROL ACETATE, DL-, THIAMINE MONONITRATE, RIBOFLAVIN, NIACINAMIDE, PYRIDOXINE HYDROCHLORIDE, FOLIC ACID, CYANOCOBALAMIN, CALCIUM CARBONATE, FERROUS FUMARATE, ZINC OXIDE, CUPRIC OXIDE 3080; 12; 120; 400; 1; 1.84; 3; 20; 22; 920; 25; 200; 27; 10; 2 [IU]/1; UG/1; MG/1; [IU]/1; MG/1; MG/1; MG/1; MG/1; MG/1; [IU]/1; MG/1; MG/1; MG/1; MG/1; MG/1
1 TABLET, FILM COATED ORAL EVERY MORNING
Status: DISCONTINUED | OUTPATIENT
Start: 2017-01-17 | End: 2017-01-18 | Stop reason: HOSPADM

## 2017-01-16 RX ORDER — DOCUSATE SODIUM 100 MG/1
100 CAPSULE, LIQUID FILLED ORAL 2 TIMES DAILY PRN
Status: DISCONTINUED | OUTPATIENT
Start: 2017-01-16 | End: 2017-01-18 | Stop reason: HOSPADM

## 2017-01-16 RX ORDER — MAG HYDROX/ALUMINUM HYD/SIMETH 400-400-40
1 SUSPENSION, ORAL (FINAL DOSE FORM) ORAL
Status: DISCONTINUED | OUTPATIENT
Start: 2017-01-16 | End: 2017-01-18 | Stop reason: HOSPADM

## 2017-01-16 RX ORDER — METHYLERGONOVINE MALEATE 0.2 MG/ML
0.2 INJECTION INTRAVENOUS PRN
Status: DISCONTINUED | OUTPATIENT
Start: 2017-01-16 | End: 2017-01-18 | Stop reason: HOSPADM

## 2017-01-16 RX ORDER — OXYTOCIN 10 [USP'U]/ML
10 INJECTION, SOLUTION INTRAMUSCULAR; INTRAVENOUS ONCE
Status: ACTIVE | OUTPATIENT
Start: 2017-01-16 | End: 2017-01-17

## 2017-01-16 RX ORDER — ONDANSETRON 2 MG/ML
4 INJECTION INTRAMUSCULAR; INTRAVENOUS EVERY 6 HOURS PRN
Status: DISCONTINUED | OUTPATIENT
Start: 2017-01-16 | End: 2017-01-18 | Stop reason: HOSPADM

## 2017-01-16 RX ORDER — OXYCODONE HYDROCHLORIDE AND ACETAMINOPHEN 5; 325 MG/1; MG/1
2 TABLET ORAL EVERY 4 HOURS PRN
Status: DISCONTINUED | OUTPATIENT
Start: 2017-01-16 | End: 2017-01-18 | Stop reason: HOSPADM

## 2017-01-16 RX ORDER — IBUPROFEN 600 MG/1
600 TABLET ORAL EVERY 6 HOURS PRN
Status: DISCONTINUED | OUTPATIENT
Start: 2017-01-16 | End: 2017-01-18 | Stop reason: HOSPADM

## 2017-01-16 RX ORDER — CARBOPROST TROMETHAMINE 250 UG/ML
250 INJECTION, SOLUTION INTRAMUSCULAR PRN
Status: DISCONTINUED | OUTPATIENT
Start: 2017-01-16 | End: 2017-01-18 | Stop reason: HOSPADM

## 2017-01-16 RX ORDER — MISOPROSTOL 200 UG/1
800 TABLET ORAL PRN
Status: DISCONTINUED | OUTPATIENT
Start: 2017-01-16 | End: 2017-01-18 | Stop reason: HOSPADM

## 2017-01-16 RX ORDER — ACETAMINOPHEN 325 MG/1
650 TABLET ORAL EVERY 4 HOURS PRN
Status: DISCONTINUED | OUTPATIENT
Start: 2017-01-16 | End: 2017-01-18 | Stop reason: HOSPADM

## 2017-01-16 RX ORDER — OXYCODONE HYDROCHLORIDE AND ACETAMINOPHEN 5; 325 MG/1; MG/1
1 TABLET ORAL EVERY 4 HOURS PRN
Status: DISCONTINUED | OUTPATIENT
Start: 2017-01-16 | End: 2017-01-18 | Stop reason: HOSPADM

## 2017-01-16 RX ADMIN — IBUPROFEN 600 MG: 600 TABLET, FILM COATED ORAL at 20:06

## 2017-01-16 RX ADMIN — Medication 125 ML/HR: at 01:35

## 2017-01-16 RX ADMIN — IBUPROFEN 600 MG: 600 TABLET, FILM COATED ORAL at 01:35

## 2017-01-16 RX ADMIN — Medication 2000 ML/HR: at 00:23

## 2017-01-16 ASSESSMENT — COPD QUESTIONNAIRES
DO YOU EVER COUGH UP ANY MUCUS OR PHLEGM?: NO/ONLY WITH OCCASIONAL COLDS OR INFECTIONS
HAVE YOU SMOKED AT LEAST 100 CIGARETTES IN YOUR ENTIRE LIFE: NO/DON'T KNOW
DURING THE PAST 4 WEEKS HOW MUCH DID YOU FEEL SHORT OF BREATH: NONE/LITTLE OF THE TIME

## 2017-01-16 ASSESSMENT — PAIN SCALES - GENERAL
PAINLEVEL_OUTOF10: 2
PAINLEVEL_OUTOF10: 4
PAINLEVEL_OUTOF10: 0
PAINLEVEL_OUTOF10: 2
PAINLEVEL_OUTOF10: 0
PAINLEVEL_OUTOF10: 2
PAINLEVEL_OUTOF10: 0

## 2017-01-16 NOTE — PROCEDURES
SPONTANEOUS VAGINAL DELIVERY PROCEDURE NOTE    PATIENT ID:  NAME:  Concetta Lozada  MRN:               [id number]  YOB: 1990    Labor Course: Pt admitted for IOL due to IUGR. Received cytotec x 2 doses. SROM at 2107 while attempting balloon placement light meconium noted. Received one dose of ampicillin for positive GBS status. Category 2 tracing for recurrent variable decels. Amnio infusion given w/ 300 ml bolus. Labor progressed rapidly following ROM. Pushing efforts were effective at complete.     On 2017  at 00:01, this 26 y.o., now  39w1d , GBS positive female delivered via  with a ML episiotomy under no anesthesia a viable female infant weighing 5 lbs and 8.4 oz with APGAR scores of 8 and 8 at one and five minutes. The head delivered in a ADITYA presentation. There was no nuchal cord and infant shoulders and body delivered with no complications. There was no initial cry, cord was doubly clamped, cut and infant handed to RN in attendance. An intact placenta was delivered spontaneously at 00:23 with 3 vessel cord. A first degree ML episiotomy was repaired using 3.0 Chromic in the usual sterile fashion. Estimated blood loss was <300cc. Patient will be transferred to postpartum in stable condition and infant to  nursery.      Delivery attended by Arlene Charles CNM, APRN.

## 2017-01-16 NOTE — PROGRESS NOTES
Patient brought to postpartum from Labor and Delivery. Patient transferred to bed from wheelchair with standby assist. POC discussed at bedside. Assessment complete, fundus firm and lochia light. Infant bundled in FOB's arms. Cuddles and ID bands checked. Feeding plan and frequency discussed at bedside, helped infant to latch but infant sleepy. Patient oriented to emergency call light, call light, and skylight.

## 2017-01-16 NOTE — PROGRESS NOTES
0700- Bedside report received from JOSE RAMON Parks.  Patient denied needs.  Patient assessment done.  Patient stated that she is voiding without difficulty and passing flatus.  Patient denied dizziness and stated that she is walking without difficulty.  Discussed pain management plan and patient prefers to call for pain intervention as needed.  Reviewed plan of care.

## 2017-01-16 NOTE — DISCHARGE INSTRUCTIONS
POSTPARTUM DISCHARGE INSTRUCTIONS FOR MOM    YOB: 1990   Age: 26 y.o.               Admit Date: 1/15/2017     Discharge Date: 1/16/2017  Attending Doctor:  Dav Garcia M.D.                  Allergies:  Review of patient's allergies indicates no known allergies.    Discharged to home by car. Discharged via wheelchair, hospital escort: Yes.  Special equipment needed: Not Applicable  Belongings with: Personal  Be sure to schedule a follow-up appointment with your primary care doctor or any specialists as instructed.     Discharge Plan:   Diet Plan: Discussed  Activity Level: Discussed  Confirmed Follow up Appointment: Patient to Call and Schedule Appointment  Confirmed Symptoms Management: Discussed  Medication Reconciliation Updated: Yes  Influenza Vaccine Indication: Not indicated: Previously immunized this influenza season and > 8 years of age    REASONS TO CALL YOUR OBSTETRICIAN:  1.   Persistent fever or shaking chills (Temperature higher than 100.4)  2.   Heavy bleeding (soaking more than 1 pad per hour); Passing clots  3.   Foul odor from vagina  4.   Mastitis (Breast infection; breast pain, chills, fever, redness)  5.   Urinary pain, burning or frequency  6.   Episiotomy infection  8.   Severe depression longer than 24 hours    HAND WASHING  · Prior to handling the baby.  · Before breastfeeding or bottle feeding baby.  · After using the bathroom or changing the baby's diaper.      VAGINAL CARE  · Nothing inside vagina for 6 weeks: no sexual intercourse, tampons or douching.  · Bleeding may continue for 2-4 weeks.  Amount may vary.    · Call your physician for heavy bleeding which means soaking more than 1 pad per hour    BIRTH CONTROL  · It is possible to become pregnant at any time after delivery and while breastfeeding.  · Plan to discuss a method of birth control with your physician at your follow up visit. visit.    DIET AND ELIMINATION  · Eating more fiber (bran cereal, fruits, and  "vegetables) and drinking plenty of fluids will help to avoid constipation.  · Urinary frequency after childbirth is normal.    POSTPARTUM BLUES  During the first few days after birth, you may experience a sense of the \"blues\" which may include impatience, irritability or even crying.  These feeling come and go quickly.  However, as many as 1 in 10 women experience emotional symptoms known as postpartum depression.    Postpartum depression:  May start as early as the second or third day after delivery or take several weeks or months to develop.  Symptoms of \"blues\" are present, but are more intense:  Crying spells; loss of appetite; feelings of hopelessness or loss of control; fear of touching the baby; over concern or no concern at all about the baby; little or no concern about your own appearance/caring for yourself; and/or inability to sleep or excessive sleeping.  Contact your physician if you are experiencing any of these symptoms.    Crisis Hotline:  · [place] Crisis Hotline:  5-050-KRAARSC  Or 1-241.255.5617  · [place] Crisis Hotline:  1-109.724.4660  Or 479-990-9850    PREVENTING SHAKEN BABY:  If you are angry or stressed, PUT THE BABY IN THE CRIB, step away, take some deep breaths, and wait until you are calm to care for the baby.  DO NOT SHAKE THE BABY.  You are not alone, call a supporter for help.  · Crisis Call Center 24/7 crisis line 570-529-0940 or 1-864.428.6825  · You can also text them, text \"ANSWER\" to 243407    QUIT SMOKING/TOBACCO USE:  I understand the use of any tobacco products increases my chance of suffering from future heart disease and could cause other illnesses which may shorten my life. Quitting the use of tobacco products is the single most important thing I can do to improve my health. For further information on smoking / tobacco cessation call a Toll Free Quit Line at 1-937.879.5064 (*[employer] [place]) or 1-811.232.7043 (American Lung Association) or you can access the web " based program at www.lungusa.org.  · [place] Tobacco Users Help Line:  (191) 352-1485       Toll Free: 1-867.454.9047  · Quit Tobacco Program [facility] (403)070-7849    DEPRESSION / SUICIDE RISK:  As you are discharged from this [facility], it is important to learn how to keep safe from harming yourself.    Recognize the warning signs:  · Abrupt changes in personality, positive or negative- including increase in energy   · Giving away possessions  · Change in eating patterns- significant weight changes-  positive or negative  · Change in sleeping patterns- unable to sleep or sleeping all the time   · Unwillingness or inability to communicate  · Depression  · Unusual sadness, discouragement and loneliness  · Talk of wanting to die  · Neglect of personal appearance   · Rebelliousness- reckless behavior  · Withdrawal from people/activities they love  · Confusion- inability to concentrate     If you or a loved one observes any of these behaviors or has concerns about self-harm, here's what you can do:  · Talk about it- your feelings and reasons for harming yourself  · Remove any means that you might use to hurt yourself (examples: pills, rope, extension cords, firearm)  · Get professional help from the community (Mental Health, Substance Abuse, psychological counseling)  · Do not be alone:Call your Safe Contact- someone whom you trust who will be there for you.  · Call your local CRISIS HOTLINE 057-7221 or 399-109-0528  · Call your local Children's Mobile Crisis Response Team [place] (457) 033-7412 or www.[employer]  · Call the toll free National Suicide Prevention Hotlines   · National Suicide Prevention Lifeline 140-831-YQBS (5412)  · National Hope Line Network 800-SUICIDE (550-8258)    DISCHARGE SURVEY:  Thank you for choosing [facility].  We hope we provided you with very good care.  You may be receiving a survey.  Your opinion is valuable to us.    ADDITIONAL  EDUCATIONAL MATERIALS GIVEN TO PATIENT: none    My signature on this form indicates that:  1.  I have reviewed and understand the above information  2.  My questions regarding this information have been answered to my satisfaction.  3.  I have formulated a plan with my discharge nurse to obtain my prescribed medication for home.

## 2017-01-16 NOTE — PROGRESS NOTES
Received report with MINH Choi RN from PAPO Urbano RN. POC discussed with pt and family. Assumed care. Pt and family encouraged to contact with needs and questions.   Strip reviewed by JACINTO Charles CNM. Pt ambulating in hallways on unit until .   JACINTO Charles CNM at bedside to place cooks catheter balloon. SVE 3/70/0. SROM moderate fluid with moderate mec during attempted catheter placement followed by LD in FHT. Repetitive LD and VD to follow ROM. Pt repositioned from side to side and assisted into hands and knees position to improve FHT.   JACINTO Charles CNM at bedside. Reviewed tracing. SVE by provider. /0. IUPC placed by provider.   JACINTO Charles CNM at bedside. Reviewed tracing. Amnioinfusion ordered. 300 mL bolus followed by 50 mL/hr continuous rate.    JACINTO Charles CNM at bedside. Reviewed tracing. Terbutaline ordered.   Terbutaline given by RN (see MAR).   Amnioinfusion via IUPC initiated by OPHELIA Tatum RN.   JACINTO Charles CNM at bedside. Reviewed tracing. SVE by provider 90/0.  2347 JACINTO Charles CNM at bedside. SVE by provider, Pt complete/100/+2. RT notified for impending delivery with meconium presence.   2349: Pt started pushing with provider and RN at bedside.   2351 Decels noted with pushing. O2 at 10 L placed on pt via facemask.  0001  of a viable female. APGARS 8/8. Infant placed on mother's chest until cord cut and taken to baby warmer for eval by RT and infant RN. Bands applied to infant, mother, and family member.  0023 Placenta delivered S/I.  0145 Pt up to bathroom. Voided. Darshana care performed.   0155 Pt and infant transferred to S303 in stable condition. Report given with MNIH Choi RN to Kasey ALBRIGHT. Id bands verified. Cuddles activated.

## 2017-01-16 NOTE — FLOWSHEET NOTE
Pt doing well good change in cervix pt now 2/50/-2 mid. Reactive strip and pt states doesn't feel much with contractions.2nd dose of cytotec placed at 1700 pt nicol well as well as baby.pt made comfortable no needs at this time. 1830 pt cont stable settled and ready for noc shift. Will report off to noc shift at 1900 plan of care to cont.

## 2017-01-16 NOTE — PROGRESS NOTES
S: Pt is feeling occas CTX. Discussed plan of care, agrees to placement of balloon..      O:    Filed Vitals:    01/15/17 1512 01/15/17 1615 01/15/17 1718 01/15/17 1856   BP: 112/78 119/76 114/82 112/71   Pulse: 92 78 77 82   Temp:   36.5 °C (97.7 °F) 36.5 °C (97.7 °F)   Resp:    16   Height:       Weight:               FHTs:  Baseline 145, + accels, - decels, moderate variability, variable decel following SROM        [place]: Contractions q 1-2 minutes, mild to palpation        SVE: 2/70/0     A/P:  1.  IUP @ 39w0d            2.  Cat 2 FHTs             3.  SROM while attempting placement of balloon, light mec           4.  Cervix: 3/80/0           5. Will begin pitocin           6. Ampicillin for positive GBS status    Arlene Charles CNM, APRN

## 2017-01-17 LAB
ERYTHROCYTE [DISTWIDTH] IN BLOOD BY AUTOMATED COUNT: 43.2 FL (ref 35.9–50)
HCT VFR BLD AUTO: 34.3 % (ref 37–47)
HGB BLD-MCNC: 11.1 G/DL (ref 12–16)
MCH RBC QN AUTO: 27.7 PG (ref 27–33)
MCHC RBC AUTO-ENTMCNC: 32.4 G/DL (ref 33.6–35)
MCV RBC AUTO: 85.5 FL (ref 81.4–97.8)
PLATELET # BLD AUTO: 229 K/UL (ref 164–446)
PMV BLD AUTO: 10.9 FL (ref 9–12.9)
RBC # BLD AUTO: 4.01 M/UL (ref 4.2–5.4)
WBC # BLD AUTO: 15 K/UL (ref 4.8–10.8)

## 2017-01-17 PROCEDURE — 3E0234Z INTRODUCTION OF SERUM, TOXOID AND VACCINE INTO MUSCLE, PERCUTANEOUS APPROACH: ICD-10-PCS | Performed by: OBSTETRICS & GYNECOLOGY

## 2017-01-17 PROCEDURE — 90471 IMMUNIZATION ADMIN: CPT

## 2017-01-17 PROCEDURE — 90686 IIV4 VACC NO PRSV 0.5 ML IM: CPT | Performed by: NURSE PRACTITIONER

## 2017-01-17 PROCEDURE — 700112 HCHG RX REV CODE 229

## 2017-01-17 PROCEDURE — 85027 COMPLETE CBC AUTOMATED: CPT

## 2017-01-17 PROCEDURE — A9270 NON-COVERED ITEM OR SERVICE: HCPCS | Performed by: NURSE PRACTITIONER

## 2017-01-17 PROCEDURE — 770002 HCHG ROOM/CARE - OB PRIVATE (112)

## 2017-01-17 PROCEDURE — 90715 TDAP VACCINE 7 YRS/> IM: CPT

## 2017-01-17 PROCEDURE — 36415 COLL VENOUS BLD VENIPUNCTURE: CPT

## 2017-01-17 PROCEDURE — 700102 HCHG RX REV CODE 250 W/ 637 OVERRIDE(OP): Performed by: NURSE PRACTITIONER

## 2017-01-17 PROCEDURE — 700111 HCHG RX REV CODE 636 W/ 250 OVERRIDE (IP): Performed by: NURSE PRACTITIONER

## 2017-01-17 RX ADMIN — TETANUS TOXOID, REDUCED DIPHTHERIA TOXOID AND ACELLULAR PERTUSSIS VACCINE, ADSORBED 0.5 ML: 5; 2.5; 8; 8; 2.5 SUSPENSION INTRAMUSCULAR at 04:08

## 2017-01-17 RX ADMIN — Medication 1 TABLET: at 08:45

## 2017-01-17 RX ADMIN — INFLUENZA A VIRUS A/CALIFORNIA/7/2009 X-179A (H1N1) ANTIGEN (FORMALDEHYDE INACTIVATED), INFLUENZA A VIRUS A/HONG KONG/4801/2014 X-263B (H3N2) ANTIGEN (FORMALDEHYDE INACTIVATED), INFLUENZA B VIRUS B/PHUKET/3073/2013 ANTIGEN (FORMALDEHYDE INACTIVATED), AND INFLUENZA B VIRUS B/BRISBANE/60/2008 ANTIGEN (FORMALDEHYDE INACTIVATED) 0.5 ML: 15; 15; 15; 15 INJECTION, SUSPENSION INTRAMUSCULAR at 04:07

## 2017-01-17 ASSESSMENT — PAIN SCALES - GENERAL
PAINLEVEL_OUTOF10: 0
PAINLEVEL_OUTOF10: 4
PAINLEVEL_OUTOF10: 0
PAINLEVEL_OUTOF10: 0
PAINLEVEL_OUTOF10: 3
PAINLEVEL_OUTOF10: 0
PAINLEVEL_OUTOF10: 0

## 2017-01-17 NOTE — PROGRESS NOTES
4867- Bedside report received from JOSE RAMON Villareal.  Patient denied needs.  3103- Patient assessment done.  Patient declined offer for pain intervention.  Patient stated that she is voiding without difficulty and passing flatus.  Patient denied dizziness and stated that she is walking without difficulty.  Discussed pain management plan and patient prefers to call for pain intervention as needed.  Reviewed plan of care.  Family member at bedside.

## 2017-01-17 NOTE — PROGRESS NOTES
Post Partum Progress Note    Name:   Concetta Lozada   Date/Time:  1/17/2017 - 6:46 AM  Chief Admitting Dx:  Pregnancy  IOL  Labor and delivery indication for care or intervention  Delivery Type:  vaginal, spontaneous  Post-Op/Post Partum Days #:  1    Subjective:  Abdominal pain: no  Ambulating:   yes  Tolerating liquids:  yes  Tolerating food:  yes common adult  Flatus:   yes  BM:    no  Bleeding:   with a small amount of bleeding  Voiding:   yes  Dizziness:   no  Feeding:   breast    Filed Vitals:    01/16/17 0805 01/16/17 1200 01/16/17 1615 01/16/17 2000   BP: 102/67 104/66  104/74   Pulse: 62 95  92   Temp:  36.8 °C (98.2 °F) 36.8 °C (98.2 °F) 36.5 °C (97.7 °F)   TempSrc:       Resp:  20  18   Height:       Weight:       SpO2: 97% 96%  94%       Exam:  Breast: Tenderness no and Engorged no  Abdomen: Abdomen soft, non-tender. BS normal. No masses,  No organomegaly  Fundal Tenderness:  no  Fundus Firm: yes  Incision: none  Below umbilicus: yes  Perineum: perineum intact  Lochia: mild  Extremities: trace extremities, peripheral pulses and reflexes normal    Meds:  Current Facility-Administered Medications   Medication Dose   • oxytocin (PITOCIN) infusion (for postpartum)   mL/hr   • methylergonovine (METHERGINE) injection 0.2 mg  0.2 mg   • carboPROST (HEMABATE) injection 250 mcg  250 mcg   • misoprostol (CYTOTEC) tablet 800 mcg  800 mcg   • ibuprofen (MOTRIN) tablet 600 mg  600 mg   • oxycodone-acetaminophen (PERCOCET) 5-325 MG per tablet 1 Tab  1 Tab   • oxycodone-acetaminophen (PERCOCET) 5-325 MG per tablet 2 Tab  2 Tab   • ondansetron (ZOFRAN) syringe/vial injection 4 mg  4 mg   • docusate sodium (COLACE) capsule 100 mg  100 mg   • bisacodyl (DULCOLAX) suppository 10 mg  10 mg   • glycerin (adult) suppository 1 Suppository  1 Suppository   • magnesium hydroxide (MILK OF MAGNESIA) suspension 30 mL  30 mL   • prenatal plus vitamin (STUARTNATAL 1+1) 27-1 MG tablet 1 Tab  1 Tab   • acetaminophen  (TYLENOL) tablet 650 mg  650 mg       Labs:   Recent Labs      01/15/17   1000  01/17/17   0212   WBC  9.2  15.0*   RBC  4.26  4.01*   HEMOGLOBIN  12.3  11.1*   HEMATOCRIT  36.0*  34.3*   MCV  84.5  85.5   MCH  28.9  27.7   MCHC  34.2  32.4*   RDW  42.5  43.2   PLATELETCT  249  229   MPV  10.9  10.9       Assessment:  Chief Admitting Dx:  Pregnancy  IOL  Labor and delivery indication for care or intervention  Delivery Type:  vaginal, spontaneous  Tubal Ligation:  no    Plan:  Continue routine post partum care. Advance care, encourage ambulation, pain control, GBS +, anticipate d/c home tomorrow, PPD#2.     DELMER Bellamy.

## 2017-01-17 NOTE — PROGRESS NOTES
Pt assessment completed, vs stable, plan of care reviewed with pt and understanding verbalized. Call light within reach. Will continue routine PP care.

## 2017-01-17 NOTE — PROGRESS NOTES
4578- Patient declined offer for phone .  Discharge education sheet reviewed with patient who verbalized understanding.  Patient stated she watched the [employer] Baby care and Mother care videos and has no questions.

## 2017-01-18 VITALS
DIASTOLIC BLOOD PRESSURE: 70 MMHG | OXYGEN SATURATION: 96 % | BODY MASS INDEX: 26.29 KG/M2 | HEART RATE: 82 BPM | SYSTOLIC BLOOD PRESSURE: 104 MMHG | RESPIRATION RATE: 20 BRPM | WEIGHT: 154 LBS | TEMPERATURE: 98 F | HEIGHT: 64 IN

## 2017-01-18 PROCEDURE — 700102 HCHG RX REV CODE 250 W/ 637 OVERRIDE(OP): Performed by: NURSE PRACTITIONER

## 2017-01-18 PROCEDURE — A9270 NON-COVERED ITEM OR SERVICE: HCPCS | Performed by: NURSE PRACTITIONER

## 2017-01-18 PROCEDURE — 700112 HCHG RX REV CODE 229: Performed by: NURSE PRACTITIONER

## 2017-01-18 RX ORDER — PSEUDOEPHEDRINE HCL 30 MG
100 TABLET ORAL 2 TIMES DAILY PRN
Qty: 60 CAP | Refills: 0 | Status: SHIPPED | OUTPATIENT
Start: 2017-01-18 | End: 2017-03-19

## 2017-01-18 RX ORDER — IBUPROFEN 600 MG/1
600 TABLET ORAL EVERY 6 HOURS PRN
Qty: 30 TAB | Refills: 0 | Status: SHIPPED | OUTPATIENT
Start: 2017-01-18 | End: 2017-02-02

## 2017-01-18 RX ADMIN — IBUPROFEN 600 MG: 600 TABLET, FILM COATED ORAL at 08:52

## 2017-01-18 RX ADMIN — DOCUSATE SODIUM 100 MG: 100 CAPSULE ORAL at 08:52

## 2017-01-18 RX ADMIN — Medication 1 TABLET: at 08:52

## 2017-01-18 ASSESSMENT — PAIN SCALES - GENERAL
PAINLEVEL_OUTOF10: 5
PAINLEVEL_OUTOF10: 0

## 2017-01-18 NOTE — DISCHARGE SUMMARY
Discharge Summary:      Concetta Lozada      Admit Date:   1/15/2017  Discharge Date:  2017     Admitting diagnosis:  Pregnancy  IOL  Labor and delivery indication for care or intervention  Discharge Diagnosis: Status post vaginal, spontaneous.  Pregnancy Complications: late transfer of care, GBS positive, IUGR  Tubal Ligation:  no        History:  Past Medical History   Diagnosis Date   • Other ill-defined conditions      POCS     OB History    Para Term  AB SAB TAB Ectopic Multiple Living   1               # Outcome Date GA Lbr Alec/2nd Weight Sex Delivery Anes PTL Lv   1 Current                    Review of patient's allergies indicates no known allergies.  Patient Active Problem List    Diagnosis Date Noted   • Labor and delivery indication for care or intervention 01/15/2017   • Supervision of normal first pregnancy 2017        Hospital Course:   26 y.o. , now para 1, was admitted with the above mentioned diagnosis, underwent Induction of Labor for IUGR, vaginal, spontaneous. Patient postpartum course was unremarkable, with progressive advancement in diet , ambulation and toleration of oral analgesia. Patient without complaints today and desires discharge.      Filed Vitals:    17 1615 17 0800 17   BP:  104/74 99/61 104/74   Pulse:  92 79 92   Temp: 36.8 °C (98.2 °F) 36.5 °C (97.7 °F) 36.7 °C (98.1 °F) 37 °C (98.6 °F)   TempSrc:       Resp:  18 18 20   Height:       Weight:       SpO2:  94% 96% 95%       Current Facility-Administered Medications   Medication Dose   • oxytocin (PITOCIN) infusion (for postpartum)   mL/hr   • methylergonovine (METHERGINE) injection 0.2 mg  0.2 mg   • carboPROST (HEMABATE) injection 250 mcg  250 mcg   • misoprostol (CYTOTEC) tablet 800 mcg  800 mcg   • ibuprofen (MOTRIN) tablet 600 mg  600 mg   • oxycodone-acetaminophen (PERCOCET) 5-325 MG per tablet 1 Tab  1 Tab   • oxycodone-acetaminophen (PERCOCET) 5-325  MG per tablet 2 Tab  2 Tab   • ondansetron (ZOFRAN) syringe/vial injection 4 mg  4 mg   • docusate sodium (COLACE) capsule 100 mg  100 mg   • bisacodyl (DULCOLAX) suppository 10 mg  10 mg   • glycerin (adult) suppository 1 Suppository  1 Suppository   • magnesium hydroxide (MILK OF MAGNESIA) suspension 30 mL  30 mL   • prenatal plus vitamin (STUARTNATAL 1+1) 27-1 MG tablet 1 Tab  1 Tab   • acetaminophen (TYLENOL) tablet 650 mg  650 mg       Exam:  Breast Exam: negative  Abdomen: Abdomen soft, non-tender. BS normal. No masses,  No organomegaly  Fundus Non Tender: yes  Incision: none  Perineum: perineum intact  Extremity: extremities, peripheral pulses and reflexes normal, no edema, redness or tenderness in the calves or thighs, Homans sign is negative, no sign of DVT     Labs:  Recent Labs      01/15/17   1000  01/17/17   0212   WBC  9.2  15.0*   RBC  4.26  4.01*   HEMOGLOBIN  12.3  11.1*   HEMATOCRIT  36.0*  34.3*   MCV  84.5  85.5   MCH  28.9  27.7   MCHC  34.2  32.4*   RDW  42.5  43.2   PLATELETCT  249  229   MPV  10.9  10.9        Activity:   Discharge to home  Pelvic Rest x 6 weeks    Assessment:  normal postpartum course  Discharge Assessment: Taking adequate diet and fluids, No heavy bleeding or foul vaginal discharge , No breast redness or severe pain of breast, voiding without difficulty and passing gas.      Follow up: .[facility]PC or [facility] in 5 weeks for vaginal  To resume daily PNV and iron supplement if needed with hydration.   Patient to RT TPC or ER if any of the following occur:  Fever over 100.5  Severe abdominal pain  Red streaks or painful masses in the breasts  Foul smelling discharge or lochia  Heavy vaginal bleeding saturating a pad per hour  S/s of PP depression     Discharge Meds:   No current outpatient prescriptions on file.       Federica Wallace M.D.

## 2017-02-21 ENCOUNTER — POST PARTUM (OUTPATIENT)
Dept: OBGYN | Facility: CLINIC | Age: 27
End: 2017-02-21
Payer: MEDICAID

## 2017-02-21 VITALS — BODY MASS INDEX: 23.85 KG/M2 | WEIGHT: 139 LBS | SYSTOLIC BLOOD PRESSURE: 94 MMHG | DIASTOLIC BLOOD PRESSURE: 60 MMHG

## 2017-02-21 PROBLEM — Z34.00 SUPERVISION OF NORMAL FIRST PREGNANCY: Status: RESOLVED | Noted: 2017-01-09 | Resolved: 2017-02-21

## 2017-02-21 PROCEDURE — 90050 PR POSTPARTUM VISIT: CPT | Performed by: NURSE PRACTITIONER

## 2017-02-21 RX ORDER — SIMETHICONE 80 MG
80 TABLET,CHEWABLE ORAL EVERY 6 HOURS PRN
Qty: 30 TAB | Refills: 1 | Status: SHIPPED | OUTPATIENT
Start: 2017-02-21

## 2017-02-21 NOTE — PROGRESS NOTES
Subjective   Subjective:    Concetta Lozada is a 26 y.o. female who presents for her postpartum exam s/p  with a midline episiotomy. Induction of labor was for IUGR. Her prenatal course was uncomplicated. She currently complaints of severe gas pain and pain in her abdomen when sitting down. States baby has gas discomfort also and is concerned it is her breast milk causing it. She denies dysuria, vaginal bleeding, odor, itching or breast problems. She is breast feeding. She does not need contraception as she has no sexual partner. Reports no sex prior to this appointment. Eating a regular diet without difficulty. Bowel movement are Normal with no reports of constipation. Patient Denies Incisional pain, drainage or redness. Patient denies postpartum depression.     Problem List     There are no active problems to display for this patient.      Objective    Vaginal exam normal with no tenderness at area of suturing. No abdominal tenderness ilicited.     UA clean catch    See PE  Lab: 11.1/34.3  Weight - 139 lb  Vitals - 94/60    Assessment   Assessment:    1. PP care of lactating women   2. Exam WNL .  3. Pap WNL no hx.   4. Desires contraception     Plan   Plan:    1. Breastfeeding support   2. Continue PNV   3. Contraceptive counseling - follow up w health dept or [facility] for ongoing   4. Encouraged condom use for contraceptive start up and std protection if needed  5. Discussed diet, exercise and resumption of sexual activity   6. Preconception guidance for next pregnancy if applicable. No specific risk factors. Folic acid for all women of childbearing age.   7.  Simethicone to pharmacy.

## 2017-02-21 NOTE — MR AVS SNAPSHOT
Concetta Lozada   2017 1:00 PM   Post Partum   MRN: [id number]    [facility]   Dept Phone:  [phone]    Description:  Female : 1990   Provider:  Jennifer Maynard D.N.P.           Allergies as of 2017     No Known Allergies      You were diagnosed with     Gas   [348773]         Vital Signs     Blood Pressure Weight Last Menstrual Period Breastfeeding? Smoking Status       94/60 mmHg 63.05 kg (139 lb) 2016 Unknown Never Smoker        Basic Information     Date Of Birth Sex Race Ethnicity Preferred Language    1990 Female White  Origin (Greek,Uruguayan,Namibian,Alvin, etc) English      Health Maintenance        Date Due Completion Dates    IMM HEP B VACCINE (1 of 3 - Primary Series) 1992 ---    IMM HEP A VACCINE (1 of 2 - Standard Series) 1993 ---    IMM HPV VACCINE (1 of 3 - Female 3 Dose Series) 2003 ---    IMM VARICELLA (CHICKENPOX) VACCINE (1 of 2 - 2 Dose Adolescent Series) 2005 ---    PAP SMEAR 2013 ---    IMM DTaP/Tdap/Td Vaccine (2 - Td) 2027            Current Immunizations     Influenza Vaccine Quad Inj (Pf) 2017  4:07 AM    Tdap Vaccine 2017  4:08 AM      Below and/or attached are the medications your provider expects you to take. Review all of your home medications and newly ordered medications with your provider and/or pharmacist. Follow medication instructions as directed by your provider and/or pharmacist. Please keep your medication list with you and share with your provider. Update the information when medications are discontinued, doses are changed, or new medications (including over-the-counter products) are added; and carry medication information at all times in the event of emergency situations     Allergies:  No Known Allergies          Medications  Valid as of: 2017 -  1:30 PM    Generic Name Brand Name Tablet Size Instructions for use    Docusate Sodium (Cap)   MG Take 100 mg by mouth 2 times a day as needed for Constipation for up to 60 days.        Prenatal Vit w/ Fe Bisg-FA   Take  by mouth.        Simethicone (Chew Tab) MYLICON 80 MG Take 1 Tab by mouth every 6 hours as needed for Flatulence.        .                 Medicines prescribed today were sent to:     [facility] [address] [place] [address], [place] - [address] [facility]    [address] [facility] [place] [address]    Phone: [phone] Fax: [phone]    Open 24 Hours?: No      Medication refill instructions:       If your prescription bottle indicates you have medication refills left, it is not necessary to call your provider’s office. Please contact your pharmacy and they will refill your medication.    If your prescription bottle indicates you do not have any refills left, you may request refills at any time through one of the following ways: The online [email or web address] system (except Urgent Care), by calling your provider’s office, or by asking your pharmacy to contact your provider’s office with a refill request. Medication refills are processed only during regular business hours and may not be available until the next business day. Your provider may request additional information or to have a follow-up visit with you prior to refilling your medication.   *Please Note: Medication refills are assigned a new Rx number when refilled electronically. Your pharmacy may indicate that no refills were authorized even though a new prescription for the same medication is available at the pharmacy. Please request the medicine by name with the pharmacy before contacting your provider for a refill.           [email or web address] Access Code: [email or web address]  Expires: 3/23/2017  1:30 PM    [email or web address]  A secure, online tool to manage your health information     [employer]’s [email or web address]® is a secure, online tool that connects you to your personalized health information from the privacy of your home -- day or night - making it very easy for you to  manage your healthcare. Once the activation process is completed, you can even access your medical information using the Bee Resilient jayne, which is available for free in the Apple Jayne store or Google Play store.     Bee Resilient provides the following levels of access (as shown below):   My Chart Features   [facility] Doctor [facility]  Specialists [facility]  Urgent  Care [facility]  Primary Care  Doctor   Email your healthcare team securely and privately 24/7 X X X    Manage appointments: schedule your next appointment; view details of past/upcoming appointments X      Request prescription refills. X      View recent personal medical records, including lab and immunizations X X X X   View health record, including health history, allergies, medications X X X X   Read reports about your outpatient visits, procedures, consult and ER notes X X X X   See your discharge summary, which is a recap of your hospital and/or ER visit that includes your diagnosis, lab results, and care plan. X X       How to register for Bee Resilient:  1. Go to  [email or web address].  2. Click on the Sign Up Now box, which takes you to the New Member Sign Up page. You will need to provide the following information:  a. Enter your Bee Resilient Access Code exactly as it appears at the top of this page. (You will not need to use this code after you’ve completed the sign-up process. If you do not sign up before the expiration date, you must request a new code.)   b. Enter your date of birth.   c. Enter your home email address.   d. Click Submit, and follow the next screen’s instructions.  3. Create a Bee Resilient ID. This will be your Bee Resilient login ID and cannot be changed, so think of one that is secure and easy to remember.  4. Create a Bee Resilient password. You can change your password at any time.  5. Enter your Password Reset Question and Answer. This can be used at a later time if you forget your password.   6. Enter your e-mail address. This allows you to  receive e-mail notifications when new information is available in Pegg'd.  7. Click Sign Up. You can now view your health information.    For assistance activating your Pegg'd account, call [phone]

## 2017-02-21 NOTE — PROGRESS NOTES
"Pt here today for postpartum exam.  Delivery Date 1/16/17  Currently: breast feeding   BCM: pt declines bc, [facility].   Wt: 139lb  BP: 94/60  Good ph: [phone]  Pt states having \"gas\" problems, and acid reflux.        "

## 2017-02-21 NOTE — PROGRESS NOTES
Subjective:      Concetta Lozada is a 26 y.o. female who presents with No chief complaint on file.            HPI    ROS       Objective:     BP 94/60 mmHg  Wt 63.05 kg (139 lb)  LMP 04/17/2016  Breastfeeding? Unknown     Physical Exam   Constitutional: She is oriented to person, place, and time.   HENT:   Head: Normocephalic and atraumatic.   Eyes: Pupils are equal, round, and reactive to light.   Neck: Normal range of motion. No thyromegaly present.   Cardiovascular: Normal rate and regular rhythm.    Pulmonary/Chest: Effort normal and breath sounds normal. No respiratory distress.   Abdominal: Soft. Bowel sounds are normal. She exhibits distension. There is no tenderness.   Genitourinary: Vagina normal.   No odiferous discharge. Nontender to mckenna at midline episiotomy site.    Musculoskeletal: Normal range of motion.   Neurological: She is alert and oriented to person, place, and time.   Skin: Skin is warm and dry.   Psychiatric: She has a normal mood and affect. Her behavior is normal. Judgment and thought content normal.   Nursing note and vitals reviewed.              Assessment/Plan:     1. Gas    - simethicone (MYLICON) 80 MG Chew Tab; Take 1 Tab by mouth every 6 hours as needed for Flatulence.  Dispense: 30 Tab; Refill: 1

## 2023-12-19 NOTE — CARE PLAN
Problem: Alteration in comfort related to episiotomy, vaginal repair and/or after birth pains  Goal: Patient is able to ambulate, care for self and infant  Outcome: PROGRESSING AS EXPECTED  Pt verbalizes acceptable pain level, pt ambulating and caring for self and infant. Encouraged to ask for pain meds if needed, she does not want them around the clock.    Problem: Potential knowledge deficit related to lack of understanding of self and  care  Goal: Patient will demonstrate ability to care for self and infant  Outcome: PROGRESSING AS EXPECTED  PT demonstrates appropriate care for self and infant. Encouraged to call for assistance or with questions and concerns.        
Problem: Alteration in comfort related to episiotomy, vaginal repair and/or after birth pains  Goal: Patient verbalizes acceptable pain level  Outcome: PROGRESSING AS EXPECTED  Pt declines pain and pain medication at this time. She states she will call if she needs pain medication.    Problem: Potential knowledge deficit related to lack of understanding of self and  care  Goal: Patient will verbalize understanding of self and infant care  Outcome: PROGRESSING AS EXPECTED  Pt verbalizes understanding of self and infant care. Encouraged to call for assistance or with questions and concerns.        
Problem: Altered physiologic condition related to immediate post-delivery state and potential for bleeding/hemorrhage  Goal: Patient physiologically stable as evidenced by normal lochia, palpable uterine involution and vital signs within normal limits  Outcome: PROGRESSING AS EXPECTED  Fundus firm, lochia light. VSS. Will continue to monitor.    Problem: Potential for postpartum infection related to presence of episiotomy/vaginal tear and/or uterine contamination  Goal: Patient will be absent from signs and symptoms of infection  Outcome: PROGRESSING AS EXPECTED  Patient has no signs or symptoms of infection. VSS        
Problem: Communication  Goal: The ability to communicate needs accurately and effectively will improve  Outcome: PROGRESSING AS EXPECTED  Reviewed plan of care with patient who verbalized understanding.    Problem: Altered physiologic condition related to immediate post-delivery state and potential for bleeding/hemorrhage  Goal: Patient physiologically stable as evidenced by normal lochia, palpable uterine involution and vital signs within normal limits  Fundus firm.  Lochia scant.  VSS.    Problem: Potential for postpartum infection related to presence of episiotomy/vaginal tear and/or uterine contamination  Goal: Patient will be absent from signs and symptoms of infection  Patient is afebrile.        
Problem: Communication  Goal: The ability to communicate needs accurately and effectively will improve  Outcome: PROGRESSING AS EXPECTED  Reviewed plan of care with patient who verbalized understanding.    Problem: Altered physiologic condition related to immediate post-delivery state and potential for bleeding/hemorrhage  Goal: Patient physiologically stable as evidenced by normal lochia, palpable uterine involution and vital signs within normal limits  Fundus firm.  Lochia scant.  VSS.    Problem: Potential for postpartum infection related to presence of episiotomy/vaginal tear and/or uterine contamination  Goal: Patient will be absent from signs and symptoms of infection  Patient is afebrile.        
Problem: Knowledge Deficit  Goal: Patient/Support person demonstrates understanding regarding the progression of labor, available options and participates in decision-making process  Intervention: Provide accurate information in basic terms, clarify misconceptions. Encourage patient and support person to ask questions and verbalize their understanding.  Pt given clear easy to understand instructions and all questions answered.      Problem: Psychosocial needs  Goal: Anxiety reduction  Pt enc to ask any questions and reassured about all aspects of labor process        
Problem: Knowledge Deficit  Goal: Patient/Support person demonstrates understanding regarding the progression of labor, available options and participates in decision-making process  Outcome: PROGRESSING AS EXPECTED  Discussed POC with pt and family. Questions answered.        
Problem: Pain Management  Goal: Pain level will decrease to patient’s comfort goal  Outcome: PROGRESSING AS EXPECTED  Intervention: Follow pain managment plan developed in collaboration with patient and Interdisciplinary Team  Pain management discussed , wouldlike her meds to be prn.              
Problem: Potential for postpartum infection related to presence of episiotomy/vaginal tear and/or uterine contamination  Goal: Patient will be absent from signs and symptoms of infection  Outcome: PROGRESSING AS EXPECTED  Assessment done, fundus firm, light lohia.Fundal massage donr.        
Problem: Risk for Infection, Impaired Wound Healing  Goal: Remain free from signs and symptoms of infection  Outcome: PROGRESSING AS EXPECTED  Pt afebrile. No s/s of infection noted.        
[Routine Follow-Up] : routine follow-up visit for